# Patient Record
Sex: FEMALE | Race: WHITE | NOT HISPANIC OR LATINO | Employment: OTHER | URBAN - NONMETROPOLITAN AREA
[De-identification: names, ages, dates, MRNs, and addresses within clinical notes are randomized per-mention and may not be internally consistent; named-entity substitution may affect disease eponyms.]

---

## 2017-04-06 ENCOUNTER — OFFICE VISIT (OUTPATIENT)
Dept: UROLOGY | Facility: CLINIC | Age: 52
End: 2017-04-06

## 2017-04-06 VITALS
DIASTOLIC BLOOD PRESSURE: 68 MMHG | SYSTOLIC BLOOD PRESSURE: 110 MMHG | HEART RATE: 74 BPM | TEMPERATURE: 99.5 F | OXYGEN SATURATION: 95 %

## 2017-04-06 DIAGNOSIS — N30.00 ACUTE CYSTITIS WITHOUT HEMATURIA: Primary | ICD-10-CM

## 2017-04-06 DIAGNOSIS — N31.9 NEUROGENIC BLADDER: ICD-10-CM

## 2017-04-06 DIAGNOSIS — N32.81 OVERACTIVE BLADDER: ICD-10-CM

## 2017-04-06 DIAGNOSIS — Z78.9 SELF-CATHETERIZES URINARY BLADDER: ICD-10-CM

## 2017-04-06 DIAGNOSIS — IMO0002 SOLITARY KIDNEY: ICD-10-CM

## 2017-04-06 DIAGNOSIS — R30.0 DYSURIA: ICD-10-CM

## 2017-04-06 DIAGNOSIS — N76.2 ATROPHIC VULVITIS: ICD-10-CM

## 2017-04-06 PROBLEM — R51.9 BENIGN HEADACHE: Status: ACTIVE | Noted: 2017-04-06

## 2017-04-06 PROBLEM — M54.2 CERVICAL PAIN: Status: ACTIVE | Noted: 2017-04-06

## 2017-04-06 PROBLEM — G43.909 HEADACHE, MIGRAINE: Status: ACTIVE | Noted: 2017-04-06

## 2017-04-06 PROBLEM — G82.20 LOWER PARAPLEGIA: Status: ACTIVE | Noted: 2017-04-06

## 2017-04-06 PROBLEM — F45.8 BRUXISM: Status: ACTIVE | Noted: 2017-04-06

## 2017-04-06 LAB
BILIRUB BLD-MCNC: NEGATIVE MG/DL
CLARITY, POC: CLEAR
COLOR UR: YELLOW
GLUCOSE UR STRIP-MCNC: NEGATIVE MG/DL
KETONES UR QL: NEGATIVE
LEUKOCYTE EST, POC: ABNORMAL
NITRITE UR-MCNC: NEGATIVE MG/ML
PH UR: 7 [PH] (ref 5–8)
PROT UR STRIP-MCNC: NEGATIVE MG/DL
RBC # UR STRIP: NEGATIVE /UL
SP GR UR: 1.02 (ref 1–1.03)
UROBILINOGEN UR QL: NORMAL

## 2017-04-06 PROCEDURE — 81003 URINALYSIS AUTO W/O SCOPE: CPT | Performed by: UROLOGY

## 2017-04-06 PROCEDURE — 99214 OFFICE O/P EST MOD 30 MIN: CPT | Performed by: UROLOGY

## 2017-04-06 RX ORDER — BACLOFEN 20 MG/1
TABLET ORAL
COMMUNITY
Start: 2014-09-02 | End: 2018-03-08 | Stop reason: SDUPTHER

## 2017-04-06 RX ORDER — MINOCYCLINE HYDROCHLORIDE 50 MG/1
TABLET ORAL
COMMUNITY
Start: 2014-09-02 | End: 2018-03-15 | Stop reason: HOSPADM

## 2017-04-06 RX ORDER — METHENAMINE HIPPURATE 1000 MG/1
1 TABLET ORAL 2 TIMES DAILY WITH MEALS
Qty: 180 TABLET | Refills: 3 | Status: SHIPPED | OUTPATIENT
Start: 2017-04-06 | End: 2018-03-15 | Stop reason: HOSPADM

## 2017-04-06 RX ORDER — AMITRIPTYLINE HYDROCHLORIDE 10 MG/1
TABLET, FILM COATED ORAL
COMMUNITY
Start: 2014-09-02 | End: 2018-03-15 | Stop reason: HOSPADM

## 2017-04-06 RX ORDER — GABAPENTIN 400 MG/1
CAPSULE ORAL
COMMUNITY
Start: 2014-09-02 | End: 2018-03-08 | Stop reason: SDUPTHER

## 2017-04-06 RX ORDER — FLUTICASONE PROPIONATE 50 MCG
SPRAY, SUSPENSION (ML) NASAL
COMMUNITY
Start: 2014-10-01 | End: 2018-03-15 | Stop reason: HOSPADM

## 2017-04-06 RX ORDER — CIPROFLOXACIN 500 MG/1
500 TABLET, FILM COATED ORAL 2 TIMES DAILY
Qty: 10 TABLET | Refills: 0 | Status: SHIPPED | OUTPATIENT
Start: 2017-04-06 | End: 2018-03-08

## 2017-04-06 RX ORDER — LOTEPREDNOL ETABONATE 5 MG/G
GEL OPHTHALMIC
COMMUNITY
Start: 2014-09-25 | End: 2018-03-08 | Stop reason: SDUPTHER
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
## 2017-04-09 LAB
BACTERIA UR CULT: ABNORMAL
BACTERIA UR CULT: ABNORMAL
OTHER ANTIBIOTIC SUSC ISLT: ABNORMAL

## 2017-04-17 ENCOUNTER — TELEPHONE (OUTPATIENT)
Dept: UROLOGY | Facility: CLINIC | Age: 52
End: 2017-04-17

## 2017-04-18 NOTE — TELEPHONE ENCOUNTER
'S REPLY WAS TO TAKE CIPRO AND MYRBETRIQ AS PRESCRIBED AND TO START THE HIPREX ONCE FINISHED WITH CIPRO.

## 2017-04-24 ENCOUNTER — TELEPHONE (OUTPATIENT)
Dept: UROLOGY | Facility: CLINIC | Age: 52
End: 2017-04-24

## 2017-05-18 ENCOUNTER — TELEPHONE (OUTPATIENT)
Dept: UROLOGY | Facility: CLINIC | Age: 52
End: 2017-05-18

## 2017-10-13 ENCOUNTER — HOSPITAL ENCOUNTER (OUTPATIENT)
Dept: CT IMAGING | Facility: HOSPITAL | Age: 52
Discharge: HOME | End: 2017-10-13
Attending: FAMILY MEDICINE | Admitting: FAMILY MEDICINE

## 2017-10-13 ENCOUNTER — TRANSCRIBE ORDERS (OUTPATIENT)
Dept: ADMINISTRATIVE | Facility: HOSPITAL | Age: 52
End: 2017-10-13

## 2017-10-13 DIAGNOSIS — R10.9 ABDOMINAL PAIN, UNSPECIFIED ABDOMINAL LOCATION: ICD-10-CM

## 2017-10-13 DIAGNOSIS — R10.9 ABDOMINAL PAIN, UNSPECIFIED ABDOMINAL LOCATION: Primary | ICD-10-CM

## 2017-10-13 PROCEDURE — 74176 CT ABD & PELVIS W/O CONTRAST: CPT

## 2018-01-19 DIAGNOSIS — N32.81 OVERACTIVE BLADDER: ICD-10-CM

## 2018-01-22 RX ORDER — MIRABEGRON 50 MG/1
TABLET, FILM COATED, EXTENDED RELEASE ORAL
Qty: 90 TABLET | Refills: 3 | Status: SHIPPED | OUTPATIENT
Start: 2018-01-22 | End: 2018-12-22 | Stop reason: SDUPTHER

## 2018-03-08 ENCOUNTER — OFFICE VISIT (OUTPATIENT)
Dept: UROLOGY | Facility: CLINIC | Age: 53
End: 2018-03-08

## 2018-03-08 VITALS
SYSTOLIC BLOOD PRESSURE: 112 MMHG | OXYGEN SATURATION: 98 % | TEMPERATURE: 98.2 F | DIASTOLIC BLOOD PRESSURE: 62 MMHG | RESPIRATION RATE: 16 BRPM | HEART RATE: 68 BPM

## 2018-03-08 DIAGNOSIS — IMO0002 SOLITARY KIDNEY: ICD-10-CM

## 2018-03-08 DIAGNOSIS — R33.9 URINARY RETENTION: ICD-10-CM

## 2018-03-08 DIAGNOSIS — R10.9 FLANK PAIN: ICD-10-CM

## 2018-03-08 DIAGNOSIS — Z87.440 HISTORY OF UTI: Primary | ICD-10-CM

## 2018-03-08 DIAGNOSIS — N31.9 NEUROGENIC BLADDER: ICD-10-CM

## 2018-03-08 LAB
BILIRUB BLD-MCNC: NEGATIVE MG/DL
BUN SERPL-MCNC: 22 MG/DL (ref 7–20)
BUN/CREAT SERPL: 36.7 (ref 7.1–23.5)
CALCIUM SERPL-MCNC: 10.3 MG/DL (ref 8.4–10.2)
CHLORIDE SERPL-SCNC: 105 MMOL/L (ref 98–107)
CLARITY, POC: CLEAR
CO2 SERPL-SCNC: 29 MMOL/L (ref 26–30)
COLOR UR: YELLOW
CREAT SERPL-MCNC: 0.6 MG/DL (ref 0.6–1.3)
GFR SERPLBLD CREATININE-BSD FMLA CKD-EPI: 105 ML/MIN/1.73
GFR SERPLBLD CREATININE-BSD FMLA CKD-EPI: 128 ML/MIN/1.73
GLUCOSE SERPL-MCNC: 81 MG/DL (ref 74–98)
GLUCOSE UR STRIP-MCNC: NEGATIVE MG/DL
KETONES UR QL: NEGATIVE
LEUKOCYTE EST, POC: NEGATIVE
NITRITE UR-MCNC: NEGATIVE MG/ML
PH UR: 6.5 [PH] (ref 5–8)
POTASSIUM SERPL-SCNC: 4.5 MMOL/L (ref 3.5–5.1)
PROT UR STRIP-MCNC: NEGATIVE MG/DL
RBC # UR STRIP: NEGATIVE /UL
SODIUM SERPL-SCNC: 143 MMOL/L (ref 137–145)
SP GR UR: 1.01 (ref 1–1.03)
UROBILINOGEN UR QL: NORMAL

## 2018-03-08 PROCEDURE — 81003 URINALYSIS AUTO W/O SCOPE: CPT | Performed by: UROLOGY

## 2018-03-08 PROCEDURE — 99213 OFFICE O/P EST LOW 20 MIN: CPT | Performed by: UROLOGY

## 2018-03-08 RX ORDER — NITROFURANTOIN 25; 75 MG/1; MG/1
CAPSULE ORAL
Refills: 0 | COMMUNITY
Start: 2018-01-25 | End: 2018-03-15 | Stop reason: HOSPADM

## 2018-03-08 NOTE — PROGRESS NOTES
Chief Complaint  History of UTI (Patient self caths urinary bladder.)      LUCIO Kinsey is a 52 y.o.female paraplegic who returns today little early for her annual checkup with complaints of right sided flank and right lower quadrant abdominal pain.  She has a solitary kidney on the right side and so is rightfully concerned that this could be a problem with urinary tract.  She has a history of recurrent urinary tract infections since she has a neurogenic bladder and voids only with intermittent self catheter.  Her incontinence is much improved on the Myrbetriq after she cannot tolerate Ditropan.  Her voided urine today is actually clear and negative for blood.    She is sexually active without complaint but she has poor sensation.  She's been years without estrogens and is known to have a chronic vaginal discharge.  She did not get the Hiprex previously prescribed to help prevent infections because of financial reasons.  Her insurance didn't start covering her Myrbetriq and that's what she takes primarily.    Vitals:    03/08/18 1038   BP: 112/62   Pulse: 68   Resp: 16   Temp: 98.2 °F (36.8 °C)   SpO2: 98%       Past Medical History  Past Medical History:   Diagnosis Date   • Neurogenic bladder        Past Surgical History  Past Surgical History:   Procedure Laterality Date   • CHOLECYSTECTOMY     • NEPHRECTOMY Left        Medications  has a current medication list which includes the following prescription(s): myrbetriq, amitriptyline, baclofen, fluticasone, gabapentin, loteprednol etabonate, methenamine, minocycline, multiple vitamins-minerals, and nitrofurantoin (macrocrystal-monohydrate).    Allergies  Allergies   Allergen Reactions   • No Known Drug Allergy        Social History  Social History     Social History   • Marital status: Single     Spouse name: N/A   • Number of children: N/A   • Years of education: N/A     Occupational History   •  Disabled     Social History Main Topics   • Smoking status:  Never Smoker   • Smokeless tobacco: Never Used   • Alcohol use No   • Drug use: No   • Sexual activity: Defer     Other Topics Concern   • Not on file     Social History Narrative       Family History  No family history on file.    Review of Systems  Review of Systems    Physical Exam  Physical Exam   Constitutional: She is oriented to person, place, and time. She appears well-developed and well-nourished.   HENT:   Head: Normocephalic.   Eyes: EOM are normal. Pupils are equal, round, and reactive to light.   Neck: Normal range of motion. Neck supple.   Cardiovascular: Normal rate and regular rhythm.    Pulmonary/Chest: Effort normal.   Abdominal: Soft. Bowel sounds are normal.       Musculoskeletal: She exhibits no tenderness.        Arms:  Neurological: She is alert and oriented to person, place, and time.   Skin: Skin is warm and dry.   Psychiatric: She has a normal mood and affect.       Labs recent and today in the office:  Results for orders placed or performed in visit on 03/08/18   POC Urinalysis Dipstick, Automated   Result Value Ref Range    Color Yellow Yellow, Straw, Dark Yellow, Yris    Clarity, UA Clear Clear    Glucose, UA Negative Negative, 1000 mg/dL (3+) mg/dL    Bilirubin Negative Negative    Ketones, UA Negative Negative    Specific Gravity  1.015 1.005 - 1.030    Blood, UA Negative Negative    pH, Urine 6.5 5.0 - 8.0    Protein, POC Negative Negative mg/dL    Urobilinogen, UA Normal Normal    Leukocytes Negative Negative    Nitrite, UA Negative Negative         Assessment & Plan  Abdominal pain: We'll obtain a CT scan to rule out  pathology and monitor her solitary kidney.  She also wants to rule out a bladder stone.    Recurrent urinary tract infection: Thankfully at this point her urine is clear     Urinary retention/neurogenic bladder: Continue the intermittent self catheter.

## 2018-03-13 ENCOUNTER — HOSPITAL ENCOUNTER (OUTPATIENT)
Dept: CT IMAGING | Facility: HOSPITAL | Age: 53
Discharge: HOME | End: 2018-03-13
Attending: UROLOGY | Admitting: UROLOGY

## 2018-03-13 PROCEDURE — 74176 CT ABD & PELVIS W/O CONTRAST: CPT

## 2018-03-15 ENCOUNTER — OFFICE VISIT (OUTPATIENT)
Dept: UROLOGY | Facility: CLINIC | Age: 53
End: 2018-03-15

## 2018-03-15 VITALS
DIASTOLIC BLOOD PRESSURE: 74 MMHG | SYSTOLIC BLOOD PRESSURE: 118 MMHG | HEART RATE: 73 BPM | TEMPERATURE: 98.1 F | RESPIRATION RATE: 16 BRPM | OXYGEN SATURATION: 99 %

## 2018-03-15 DIAGNOSIS — E83.52 HYPERCALCEMIA: Primary | ICD-10-CM

## 2018-03-15 PROCEDURE — 99214 OFFICE O/P EST MOD 30 MIN: CPT | Performed by: UROLOGY

## 2018-03-15 NOTE — PROGRESS NOTES
Chief Complaint  Urinary Retention (self caths, follow up ct.)      LUCIO De Luna is a 52 y.o.female who is a paraplegic with neurogenic bladder handles her voiding with clean intermittent self catheter and has done very well.  She does have a solitary right kidney and with some discomfort recently she was concerned about pathology in that only kidney.  Fortunately recent CT scan revealed normal right-sided urinary tract without any suggestion of kidney stones.  She did have a proximal duct cyst found incidentally and has an appointment with her gynecologist next month.    Vitals:    03/15/18 1128   BP: 118/74   Pulse: 73   Resp: 16   Temp: 98.1 °F (36.7 °C)   SpO2: 99%       Past Medical History  Past Medical History:   Diagnosis Date   • Neurogenic bladder        Past Surgical History  Past Surgical History:   Procedure Laterality Date   • CHOLECYSTECTOMY     • NEPHRECTOMY Left        Medications  has a current medication list which includes the following prescription(s): myrbetriq, nitrofurantoin (macrocrystal-monohydrate), amitriptyline, baclofen, fluticasone, gabapentin, loteprednol etabonate, methenamine, minocycline, and multiple vitamins-minerals.    Allergies  Allergies   Allergen Reactions   • No Known Drug Allergy        Social History  Social History     Social History   • Marital status: Single     Spouse name: N/A   • Number of children: N/A   • Years of education: N/A     Occupational History   •  Disabled     Social History Main Topics   • Smoking status: Never Smoker   • Smokeless tobacco: Never Used   • Alcohol use No   • Drug use: No   • Sexual activity: Defer     Other Topics Concern   • Not on file     Social History Narrative   • No narrative on file       Family History  No family history on file.    Review of Systems  Review of Systems   Constitutional: Negative.    Genitourinary: Negative.        Physical Exam  Physical Exam    Labs recent and today in the office:  Results for  orders placed or performed in visit on 03/08/18   Basic Metabolic Panel   Result Value Ref Range    Glucose 81 74 - 98 mg/dL    BUN 22 (H) 7 - 20 mg/dL    Creatinine 0.60 0.60 - 1.30 mg/dL    eGFR Non African Am 105 >60 mL/min/1.73    eGFR African Am 128 >60 mL/min/1.73    BUN/Creatinine Ratio 36.7 (H) 7.1 - 23.5    Sodium 143 137 - 145 mmol/L    Potassium 4.5 3.5 - 5.1 mmol/L    Chloride 105 98 - 107 mmol/L    Total CO2 29.0 26.0 - 30.0 mmol/L    Calcium 10.3 (H) 8.4 - 10.2 mg/dL   POC Urinalysis Dipstick, Automated   Result Value Ref Range    Color Yellow Yellow, Straw, Dark Yellow, Yris    Clarity, UA Clear Clear    Glucose, UA Negative Negative, 1000 mg/dL (3+) mg/dL    Bilirubin Negative Negative    Ketones, UA Negative Negative    Specific Gravity  1.015 1.005 - 1.030    Blood, UA Negative Negative    pH, Urine 6.5 5.0 - 8.0    Protein, POC Negative Negative mg/dL    Urobilinogen, UA Normal Normal    Leukocytes Negative Negative    Nitrite, UA Negative Negative         Assessment & Plan  neurogenic bladder: She'll continue clean intermittent self catheter.    Solitary right kidney: No evidence pathology at this time    Hypercalcemia: Parathyroid profile is obtained.  She'll need referral to general surgery if abnormal

## 2018-03-16 LAB
CALCIUM SERPL-MCNC: 10 MG/DL (ref 8.7–10.2)
INTACT PTH: NORMAL
PTH-INTACT SERPL-MCNC: 25 PG/ML (ref 15–65)

## 2018-12-22 DIAGNOSIS — N32.81 OVERACTIVE BLADDER: ICD-10-CM

## 2018-12-26 RX ORDER — MIRABEGRON 50 MG/1
TABLET, FILM COATED, EXTENDED RELEASE ORAL
Qty: 90 TABLET | Refills: 3 | Status: SHIPPED | OUTPATIENT
Start: 2018-12-26

## 2019-03-21 ENCOUNTER — OFFICE VISIT (OUTPATIENT)
Dept: UROLOGY | Facility: CLINIC | Age: 54
End: 2019-03-21

## 2019-03-21 VITALS — HEART RATE: 91 BPM | OXYGEN SATURATION: 99 % | SYSTOLIC BLOOD PRESSURE: 122 MMHG | DIASTOLIC BLOOD PRESSURE: 68 MMHG

## 2019-03-21 DIAGNOSIS — Z78.9 SELF-CATHETERIZES URINARY BLADDER: ICD-10-CM

## 2019-03-21 DIAGNOSIS — N31.9 NEUROGENIC BLADDER: Primary | ICD-10-CM

## 2019-03-21 DIAGNOSIS — IMO0002 SOLITARY RIGHT KIDNEY: ICD-10-CM

## 2019-03-21 LAB
BILIRUB BLD-MCNC: NEGATIVE MG/DL
BUN SERPL-MCNC: 15 MG/DL (ref 7–20)
BUN/CREAT SERPL: 30 (ref 7.1–23.5)
CALCIUM SERPL-MCNC: 10.3 MG/DL (ref 8.4–10.2)
CHLORIDE SERPL-SCNC: 106 MMOL/L (ref 98–107)
CLARITY, POC: CLEAR
CO2 SERPL-SCNC: 27 MMOL/L (ref 26–30)
COLOR UR: YELLOW
CREAT SERPL-MCNC: 0.5 MG/DL (ref 0.6–1.3)
GLUCOSE SERPL-MCNC: 79 MG/DL (ref 74–98)
GLUCOSE UR STRIP-MCNC: NEGATIVE MG/DL
KETONES UR QL: NEGATIVE
LEUKOCYTE EST, POC: NEGATIVE
NITRITE UR-MCNC: NEGATIVE MG/ML
PH UR: 6 [PH] (ref 5–8)
POTASSIUM SERPL-SCNC: 4.6 MMOL/L (ref 3.5–5.1)
PROT UR STRIP-MCNC: NEGATIVE MG/DL
RBC # UR STRIP: NEGATIVE /UL
SODIUM SERPL-SCNC: 139 MMOL/L (ref 137–145)
SP GR UR: 1.02 (ref 1–1.03)
UROBILINOGEN UR QL: NORMAL

## 2019-03-21 PROCEDURE — 99213 OFFICE O/P EST LOW 20 MIN: CPT | Performed by: UROLOGY

## 2019-03-21 PROCEDURE — 81003 URINALYSIS AUTO W/O SCOPE: CPT | Performed by: UROLOGY

## 2019-03-21 RX ORDER — NITROFURANTOIN MACROCRYSTALS 100 MG/1
CAPSULE ORAL
COMMUNITY
End: 2019-03-21 | Stop reason: SDUPTHER

## 2019-03-21 RX ORDER — NITROFURANTOIN MACROCRYSTALS 100 MG/1
100 CAPSULE ORAL 2 TIMES DAILY
Qty: 20 CAPSULE | Refills: 3 | Status: SHIPPED | OUTPATIENT
Start: 2019-03-21 | End: 2019-03-31

## 2019-03-21 RX ORDER — LOTEPREDNOL ETABONATE 5 MG/ML
SUSPENSION/ DROPS OPHTHALMIC
COMMUNITY

## 2019-03-21 RX ORDER — FLUOCINOLONE ACETONIDE 0.11 MG/ML
OIL AURICULAR (OTIC)
COMMUNITY

## 2019-03-21 NOTE — PROGRESS NOTES
Chief Complaint  Neurogenic bladder/self cath (Yearly follow up.) and Solitary Right Kidney      HPI  Sosa De Luna is a 53 y.o.female who returns for follow-up with a neurogenic bladder associated with her paraplegia that is being handled by her with intermittent self cath.  He is actually done very well except for skin problems from her urge incontinence in between the catheterizations.  She has had only rare urinary tract infections and treat herself periodically with a short course of Macrobid with good effect.  She returns today with clear urine.  Her heart and incontinence is much helped with Myrbetriq but she is having a hard time getting it provided thru her insurance company.    She has recently gotten  and is having mild dyspareunia associated with inadequate lubrication.  She is many years menopausal and is never used estrogens.    There were no vitals filed for this visit.    Past Medical History  Past Medical History:   Diagnosis Date   • Neurogenic bladder        Past Surgical History  Past Surgical History:   Procedure Laterality Date   • CHOLECYSTECTOMY     • NEPHRECTOMY Left        Medications  has a current medication list which includes the following prescription(s): baclofen, betamethasone valerate, fluocinolone acetonide, gabapentin, loteprednol, multiple vitamins-minerals, myrbetriq, and nitrofurantoin.    Allergies  Allergies   Allergen Reactions   • No Known Drug Allergy        Social History  Social History     Socioeconomic History   • Marital status: Single     Spouse name: Not on file   • Number of children: Not on file   • Years of education: Not on file   • Highest education level: Not on file   Occupational History     Employer: DISABLED   Tobacco Use   • Smoking status: Never Smoker   • Smokeless tobacco: Never Used   Substance and Sexual Activity   • Alcohol use: No   • Drug use: No   • Sexual activity: Defer       Family History  No family history on file.    Review of  Systems  Review of Systems   Constitutional: Negative.    Genitourinary: Positive for difficulty urinating, dyspareunia, dysuria, frequency and urgency.   Musculoskeletal: Positive for gait problem.   All other systems reviewed and are negative.      Physical Exam  Physical Exam    Labs recent and today in the office:  Results for orders placed or performed in visit on 03/21/19   POC Urinalysis Dipstick, Automated   Result Value Ref Range    Color Yellow Yellow, Straw, Dark Yellow, Yris    Clarity, UA Clear Clear    Specific Gravity  1.020 1.005 - 1.030    pH, Urine 6.0 5.0 - 8.0    Leukocytes Negative Negative    Nitrite, UA Negative Negative    Protein, POC Negative Negative mg/dL    Glucose, UA Negative Negative, 1000 mg/dL (3+) mg/dL    Ketones, UA Negative Negative    Urobilinogen, UA Normal Normal    Bilirubin Negative Negative    Blood, UA Negative Negative         Assessment & Plan  #1 neurogenic bladder/recurrent urinary tract infection: She is handled this very well with intermittent self cath but still has problems with urge incontinence from bladder spasticity.  This is improved on Myrbetriq but in discussion with Dr. Ling he feels she may be a candidate for Botox injections.    2.  Atrophic vaginitis: I suggested a course of topical Estrace vaginal cream to see if it will help her irritated tissues and dyspareunia.

## 2019-04-30 DIAGNOSIS — N31.9 NEUROGENIC BLADDER: Primary | ICD-10-CM

## 2019-04-30 DIAGNOSIS — Z87.440 HISTORY OF UTI: ICD-10-CM

## 2019-04-30 RX ORDER — NITROFURANTOIN 25; 75 MG/1; MG/1
100 CAPSULE ORAL 2 TIMES DAILY
Qty: 14 CAPSULE | Refills: 2 | Status: SHIPPED | OUTPATIENT
Start: 2019-04-30 | End: 2021-08-24 | Stop reason: SDUPTHER

## 2019-09-24 ENCOUNTER — OFFICE VISIT (OUTPATIENT)
Dept: UROLOGY | Facility: CLINIC | Age: 54
End: 2019-09-24

## 2019-09-24 VITALS
TEMPERATURE: 98.3 F | HEART RATE: 66 BPM | DIASTOLIC BLOOD PRESSURE: 74 MMHG | OXYGEN SATURATION: 99 % | SYSTOLIC BLOOD PRESSURE: 116 MMHG

## 2019-09-24 DIAGNOSIS — N30.00 ACUTE CYSTITIS WITHOUT HEMATURIA: ICD-10-CM

## 2019-09-24 DIAGNOSIS — N31.9 NEUROGENIC BLADDER: Primary | ICD-10-CM

## 2019-09-24 LAB
BILIRUB BLD-MCNC: NEGATIVE MG/DL
CLARITY, POC: CLEAR
COLOR UR: YELLOW
GLUCOSE UR STRIP-MCNC: NEGATIVE MG/DL
KETONES UR QL: NEGATIVE
LEUKOCYTE EST, POC: ABNORMAL
NITRITE UR-MCNC: NEGATIVE MG/ML
PH UR: 6 [PH] (ref 5–8)
PROT UR STRIP-MCNC: NEGATIVE MG/DL
RBC # UR STRIP: NEGATIVE /UL
SP GR UR: 1.01 (ref 1–1.03)
UROBILINOGEN UR QL: NORMAL

## 2019-09-24 PROCEDURE — 99213 OFFICE O/P EST LOW 20 MIN: CPT | Performed by: UROLOGY

## 2019-09-24 PROCEDURE — 81003 URINALYSIS AUTO W/O SCOPE: CPT | Performed by: UROLOGY

## 2019-09-24 RX ORDER — SULFAMETHOXAZOLE AND TRIMETHOPRIM 400; 80 MG/1; MG/1
1 TABLET ORAL 2 TIMES DAILY
Qty: 20 TABLET | Refills: 0 | Status: SHIPPED | OUTPATIENT
Start: 2019-09-24 | End: 2019-09-26 | Stop reason: SDUPTHER

## 2019-09-24 RX ORDER — GABAPENTIN 300 MG/1
CAPSULE ORAL
COMMUNITY
Start: 2019-08-27

## 2019-09-24 RX ORDER — OXYBUTYNIN CHLORIDE 15 MG/1
TABLET, EXTENDED RELEASE ORAL
COMMUNITY

## 2019-09-24 NOTE — PROGRESS NOTES
Chief Complaint  Neurogenic bladder (6 month follow up.)      LUCIO De Luna is a 54 y.o.female who returns today for routine follow-up of her neurogenic bladder generally doing very well on her intermittent self cath.  She does state recently she started to have more sediment in the urine along with some more aches and pains and she was wondering if she might have an infection.  She does have some white cells and therefore we will send her cath specimen for culture and give her a short course of antibiotics pending the result.  She continues to receive good benefit from Myrbetriq with less incontinence between catheterizations.  She states she has had a lifelong history of vaginal discharge and did not use the Estrace vaginal cream suggested that might help.    There were no vitals filed for this visit.    Past Medical History  Past Medical History:   Diagnosis Date   • Neurogenic bladder        Past Surgical History  Past Surgical History:   Procedure Laterality Date   • CHOLECYSTECTOMY     • NEPHRECTOMY Left        Medications  has a current medication list which includes the following prescription(s): baclofen, betamethasone valerate, fluocinolone acetonide, gabapentin, loteprednol, multiple vitamins-minerals, myrbetriq, gabapentin, nitrofurantoin (macrocrystal-monohydrate), and oxybutynin xl.    Allergies  Allergies   Allergen Reactions   • No Known Drug Allergy        Social History  Social History     Socioeconomic History   • Marital status: Single     Spouse name: Not on file   • Number of children: Not on file   • Years of education: Not on file   • Highest education level: Not on file   Occupational History     Employer: DISABLED   Tobacco Use   • Smoking status: Never Smoker   • Smokeless tobacco: Never Used   Substance and Sexual Activity   • Alcohol use: No   • Drug use: No   • Sexual activity: Defer       Family History  No family history on file.    Review of Systems  Review of Systems    Constitutional: Negative for activity change, appetite change, chills, fatigue, unexpected weight gain and unexpected weight loss.   HENT: Negative for sneezing.    Respiratory: Negative for cough, chest tightness, shortness of breath and wheezing.    Cardiovascular: Negative for chest pain, palpitations and leg swelling.   Gastrointestinal: Negative for abdominal distention, abdominal pain, anal bleeding, blood in stool, constipation, diarrhea, nausea, rectal pain and indigestion.   Genitourinary: Positive for dysuria and vaginal discharge. Negative for amenorrhea, decreased libido, decreased urine volume, difficulty urinating, dyspareunia, flank pain, frequency, genital sores, hematuria, menstrual problem, pelvic pain, pelvic pressure, urgency, urinary incontinence, vaginal bleeding and vaginal pain.   Musculoskeletal: Positive for gait problem. Negative for back pain and joint swelling.   Neurological: Positive for weakness. Negative for tremors, seizures, speech difficulty, numbness and confusion.   Psychiatric/Behavioral: Negative for behavioral problems, dysphoric mood, self-injury, sleep disturbance, suicidal ideas, negative for hyperactivity, depressed mood and stress. The patient is not nervous/anxious.    All other systems reviewed and are negative.      Physical Exam  Physical Exam   Constitutional: She is oriented to person, place, and time. She appears well-developed and well-nourished.   HENT:   Head: Normocephalic.   Eyes: EOM are normal. Pupils are equal, round, and reactive to light.   Neck: Normal range of motion. Neck supple.   Cardiovascular: Normal rate and regular rhythm.   Pulmonary/Chest: Effort normal.   Neurological: She is alert and oriented to person, place, and time.   Skin: Skin is warm and dry.   Psychiatric: She has a normal mood and affect. Her behavior is normal.       Labs recent and today in the office:  Results for orders placed or performed in visit on 09/24/19   POC  Urinalysis Dipstick, Automated   Result Value Ref Range    Color Yellow Yellow, Straw, Dark Yellow, Yris    Clarity, UA Clear Clear    Specific Gravity  1.015 1.005 - 1.030    pH, Urine 6.0 5.0 - 8.0    Leukocytes Small (1+) (A) Negative    Nitrite, UA Negative Negative    Protein, POC Negative Negative mg/dL    Glucose, UA Negative Negative, 1000 mg/dL (3+) mg/dL    Ketones, UA Negative Negative    Urobilinogen, UA Normal Normal    Bilirubin Negative Negative    Blood, UA Negative Negative         Assessment & Plan  Neurogenic bladder/UTI: Continue the intermittent self cath push fluids and take a short course of antibiotics.  She will return in 6 months and as needed    Urge incontinence: Significant benefit from Myrbetriq

## 2019-09-26 DIAGNOSIS — N30.00 ACUTE CYSTITIS WITHOUT HEMATURIA: ICD-10-CM

## 2019-09-26 RX ORDER — SULFAMETHOXAZOLE AND TRIMETHOPRIM 400; 80 MG/1; MG/1
1 TABLET ORAL 2 TIMES DAILY
Qty: 20 TABLET | Refills: 0 | Status: SHIPPED | OUTPATIENT
Start: 2019-09-26 | End: 2022-08-11

## 2019-09-26 NOTE — TELEPHONE ENCOUNTER
Patient called asking Sulfa antibiotic to be sent to [facility].

## 2019-09-27 LAB
BACTERIA UR CULT: ABNORMAL
BACTERIA UR CULT: ABNORMAL
OTHER ANTIBIOTIC SUSC ISLT: ABNORMAL

## 2020-01-21 ENCOUNTER — TRANSCRIBE ORDERS (OUTPATIENT)
Dept: ADMINISTRATIVE | Facility: HOSPITAL | Age: 55
End: 2020-01-21

## 2020-01-21 DIAGNOSIS — R10.31 RLQ ABDOMINAL PAIN: Primary | ICD-10-CM

## 2020-01-22 ENCOUNTER — HOSPITAL ENCOUNTER (OUTPATIENT)
Dept: CT IMAGING | Facility: HOSPITAL | Age: 55
Discharge: HOME | End: 2020-01-22
Admitting: NURSE PRACTITIONER

## 2020-01-22 DIAGNOSIS — R10.31 RLQ ABDOMINAL PAIN: ICD-10-CM

## 2020-01-22 PROCEDURE — 74176 CT ABD & PELVIS W/O CONTRAST: CPT

## 2020-01-22 PROCEDURE — 0 DIATRIZOATE MEGLUMINE & SODIUM PER 1 ML: Performed by: NURSE PRACTITIONER

## 2020-01-22 RX ADMIN — DIATRIZOATE MEGLUMINE AND DIATRIZOATE SODIUM 15 ML: 660; 100 LIQUID ORAL; RECTAL at 14:50

## 2021-08-17 ENCOUNTER — OFFICE VISIT (OUTPATIENT)
Dept: OBSTETRICS AND GYNECOLOGY | Facility: CLINIC | Age: 56
End: 2021-08-17

## 2021-08-17 VITALS
HEIGHT: 64 IN | WEIGHT: 108 LBS | BODY MASS INDEX: 18.44 KG/M2 | SYSTOLIC BLOOD PRESSURE: 128 MMHG | DIASTOLIC BLOOD PRESSURE: 70 MMHG

## 2021-08-17 DIAGNOSIS — Z01.419 ENCOUNTER FOR GYNECOLOGICAL EXAMINATION WITHOUT ABNORMAL FINDING: Primary | ICD-10-CM

## 2021-08-17 DIAGNOSIS — Z12.39 ENCOUNTER FOR BREAST CANCER SCREENING OTHER THAN MAMMOGRAM: ICD-10-CM

## 2021-08-17 DIAGNOSIS — M79.621 AXILLARY PAIN, RIGHT: ICD-10-CM

## 2021-08-17 DIAGNOSIS — N64.4 BREAST PAIN, RIGHT: ICD-10-CM

## 2021-08-17 DIAGNOSIS — N95.2 POSTMENOPAUSAL ATROPHIC VAGINITIS: ICD-10-CM

## 2021-08-17 DIAGNOSIS — R63.4 WEIGHT LOSS: ICD-10-CM

## 2021-08-17 PROCEDURE — G0101 CA SCREEN;PELVIC/BREAST EXAM: HCPCS | Performed by: OBSTETRICS & GYNECOLOGY

## 2021-08-17 PROCEDURE — 99203 OFFICE O/P NEW LOW 30 MIN: CPT | Performed by: OBSTETRICS & GYNECOLOGY

## 2021-08-17 RX ORDER — BACILLUS COAGULANS/INULIN 1B-250 MG
1 CAPSULE ORAL DAILY
COMMUNITY

## 2021-08-17 RX ORDER — TRIAMCINOLONE ACETONIDE 1 MG/G
CREAM TOPICAL 2 TIMES DAILY
COMMUNITY
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
## 2021-08-24 ENCOUNTER — OFFICE VISIT (OUTPATIENT)
Dept: UROLOGY | Facility: CLINIC | Age: 56
End: 2021-08-24

## 2021-08-24 VITALS
DIASTOLIC BLOOD PRESSURE: 75 MMHG | OXYGEN SATURATION: 98 % | HEART RATE: 76 BPM | RESPIRATION RATE: 16 BRPM | SYSTOLIC BLOOD PRESSURE: 129 MMHG | TEMPERATURE: 98 F

## 2021-08-24 DIAGNOSIS — R39.9 UTI SYMPTOMS: Primary | ICD-10-CM

## 2021-08-24 DIAGNOSIS — Z87.440 HISTORY OF UTI: ICD-10-CM

## 2021-08-24 DIAGNOSIS — N31.9 NEUROGENIC BLADDER: ICD-10-CM

## 2021-08-24 LAB
BILIRUB BLD-MCNC: NEGATIVE MG/DL
CLARITY, POC: CLEAR
COLOR UR: YELLOW
GLUCOSE UR STRIP-MCNC: NEGATIVE MG/DL
KETONES UR QL: ABNORMAL
LEUKOCYTE EST, POC: ABNORMAL
NITRITE UR-MCNC: NEGATIVE MG/ML
PH UR: 5.5 [PH] (ref 5–8)
PROT UR STRIP-MCNC: NEGATIVE MG/DL
RBC # UR STRIP: NEGATIVE /UL
SP GR UR: 1.02 (ref 1–1.03)
UROBILINOGEN UR QL: NORMAL

## 2021-08-24 PROCEDURE — 99214 OFFICE O/P EST MOD 30 MIN: CPT | Performed by: UROLOGY

## 2021-08-24 PROCEDURE — 81003 URINALYSIS AUTO W/O SCOPE: CPT | Performed by: UROLOGY

## 2021-08-24 RX ORDER — NITROFURANTOIN 25; 75 MG/1; MG/1
100 CAPSULE ORAL 2 TIMES DAILY
Qty: 14 CAPSULE | Refills: 5 | Status: SHIPPED | OUTPATIENT
Start: 2021-08-24 | End: 2022-07-26 | Stop reason: SDUPTHER

## 2021-08-24 NOTE — PROGRESS NOTES
Chief Complaint  UTI Symptoms      HPI  Sosa De Luna is a 55 y.o.female who returns today requesting urgent evaluation for urinary tract infection.  She has a known neurogenic bladder and self caths 4 times per day.  She also has chronic atrophic vaginitis confirmed by Dr. Argueta on a recent visit and has a heavy vaginal discharge which contaminates voided urine samples.  Unfortunately her cath urine today appears to be infected so culture will be submitted.  Has had great luck with Macrodantin in the past and request that this be provided along with some refills.    Vitals:    08/24/21 1411   BP: 129/75   Pulse: 76   Resp: 16   Temp: 98 °F (36.7 °C)   SpO2: 98%       Past Medical History  Past Medical History:   Diagnosis Date   • Anxiety    • Neurogenic bladder    • Renal disorder     left nephrectomy   • Trauma    • Urinary tract infection        Past Surgical History  Past Surgical History:   Procedure Laterality Date   • BARTHOLIN CYST MARSUPIALIZATION     • CHOLECYSTECTOMY     • GALLBLADDER SURGERY     • NEPHRECTOMY Left        Medications  has a current medication list which includes the following prescription(s): probiotic, baclofen, betamethasone valerate, fluocinolone acetonide, gabapentin, gabapentin, loteprednol, multiple vitamin, myrbetriq, triamcinolone, nitrofurantoin (macrocrystal-monohydrate), oxybutynin xl, and sulfamethoxazole-trimethoprim.    Allergies  Allergies   Allergen Reactions   • Latex Unknown - Low Severity   • Tape Rash       Social History  Social History     Socioeconomic History   • Marital status:      Spouse name: Not on file   • Number of children: Not on file   • Years of education: Not on file   • Highest education level: Not on file   Tobacco Use   • Smoking status: Never Smoker   • Smokeless tobacco: Never Used   Vaping Use   • Vaping Use: Never used   Substance and Sexual Activity   • Alcohol use: No   • Drug use: No   • Sexual activity: Defer       Family  History  Family History   Problem Relation Age of Onset   • Hypertension Father    • Thyroid cancer Mother    • Hyperlipidemia Mother        Review of Systems  Review of Systems   Constitutional: Negative for activity change, appetite change, chills, fatigue, unexpected weight gain and unexpected weight loss.   HENT: Negative for sneezing.    Respiratory: Negative for cough, chest tightness, shortness of breath and wheezing.    Cardiovascular: Negative for chest pain, palpitations and leg swelling.   Gastrointestinal: Negative for abdominal distention, abdominal pain, anal bleeding, blood in stool, constipation, diarrhea, nausea, rectal pain and indigestion.   Genitourinary: Positive for difficulty urinating. Negative for amenorrhea, decreased libido, decreased urine volume, dyspareunia, dysuria, flank pain, frequency, genital sores, hematuria, menstrual problem, pelvic pain, pelvic pressure, urgency, urinary incontinence, vaginal bleeding, vaginal discharge and vaginal pain.   Musculoskeletal: Negative for back pain and joint swelling.   Neurological: Negative for tremors, seizures, speech difficulty, weakness, numbness and confusion.   Psychiatric/Behavioral: Negative for behavioral problems, dysphoric mood, self-injury, sleep disturbance, suicidal ideas, negative for hyperactivity, depressed mood and stress. The patient is not nervous/anxious.        Physical Exam  Physical Exam  Constitutional:       Appearance: She is well-developed.   HENT:      Head: Normocephalic.   Eyes:      Pupils: Pupils are equal, round, and reactive to light.   Cardiovascular:      Rate and Rhythm: Normal rate and regular rhythm.      Heart sounds: Normal heart sounds.   Pulmonary:      Effort: Pulmonary effort is normal.   Abdominal:      General: Bowel sounds are normal.      Palpations: Abdomen is soft.   Musculoskeletal:      Cervical back: Normal range of motion and neck supple.   Skin:     General: Skin is warm and dry.    Neurological:      Mental Status: She is alert and oriented to person, place, and time.         Labs recent and today in the office:  Results for orders placed or performed in visit on 08/24/21   POC Urinalysis Dipstick, Automated    Specimen: Urine   Result Value Ref Range    Color Yellow Yellow, Straw, Dark Yellow, Yris    Clarity, UA Clear Clear    Specific Gravity  1.025 1.005 - 1.030    pH, Urine 5.5 5.0 - 8.0    Leukocytes Trace (A) Negative    Nitrite, UA Negative Negative    Protein, POC Negative Negative mg/dL    Glucose, UA Negative Negative, 1000 mg/dL (3+) mg/dL    Ketones, UA 3+ (A) Negative    Urobilinogen, UA Normal Normal    Bilirubin Negative Negative    Blood, UA Negative Negative         Assessment & Plan  Neurogenic bladder/acute cystitis without hematuria: Macrodantin is provided pending results of a culture she is encouraged to continue catheter and return as needed.  She declines an offer of topical Estrace vaginal cream.

## 2021-09-03 ENCOUNTER — TELEPHONE (OUTPATIENT)
Dept: OBSTETRICS AND GYNECOLOGY | Facility: CLINIC | Age: 56
End: 2021-09-03

## 2021-09-10 DIAGNOSIS — Z01.419 ENCOUNTER FOR GYNECOLOGICAL EXAMINATION WITHOUT ABNORMAL FINDING: ICD-10-CM

## 2021-09-10 RX ORDER — CONJUGATED ESTROGENS 0.62 MG/G
CREAM VAGINAL
Qty: 1 EACH | Refills: 11 | Status: SHIPPED | OUTPATIENT
Start: 2021-09-10

## 2022-07-26 ENCOUNTER — OFFICE VISIT (OUTPATIENT)
Dept: UROLOGY | Facility: CLINIC | Age: 57
End: 2022-07-26

## 2022-07-26 VITALS
OXYGEN SATURATION: 99 % | HEART RATE: 79 BPM | SYSTOLIC BLOOD PRESSURE: 130 MMHG | TEMPERATURE: 97.5 F | DIASTOLIC BLOOD PRESSURE: 80 MMHG

## 2022-07-26 DIAGNOSIS — Z87.440 HISTORY OF UTI: ICD-10-CM

## 2022-07-26 DIAGNOSIS — N31.9 NEUROGENIC BLADDER: Primary | ICD-10-CM

## 2022-07-26 LAB
BILIRUB BLD-MCNC: NEGATIVE MG/DL
CLARITY, POC: CLEAR
COLOR UR: ABNORMAL
EXPIRATION DATE: ABNORMAL
GLUCOSE UR STRIP-MCNC: NEGATIVE MG/DL
KETONES UR QL: ABNORMAL
LEUKOCYTE EST, POC: NEGATIVE
Lab: ABNORMAL
NITRITE UR-MCNC: NEGATIVE MG/ML
PH UR: 5.5 [PH] (ref 5–8)
PROT UR STRIP-MCNC: ABNORMAL MG/DL
RBC # UR STRIP: NEGATIVE /UL
SP GR UR: 1.03 (ref 1–1.03)
UROBILINOGEN UR QL: NORMAL

## 2022-07-26 PROCEDURE — 99213 OFFICE O/P EST LOW 20 MIN: CPT | Performed by: NURSE PRACTITIONER

## 2022-07-26 PROCEDURE — 81003 URINALYSIS AUTO W/O SCOPE: CPT | Performed by: NURSE PRACTITIONER

## 2022-07-26 RX ORDER — GABAPENTIN 300 MG/1
600 CAPSULE ORAL
COMMUNITY
Start: 2022-03-07

## 2022-07-26 RX ORDER — BACLOFEN 20 MG/1
20 TABLET ORAL DAILY
COMMUNITY
Start: 2022-07-12

## 2022-07-26 RX ORDER — MIRTAZAPINE 15 MG/1
15 TABLET, FILM COATED ORAL
COMMUNITY
Start: 2022-02-03

## 2022-07-26 RX ORDER — CLOTRIMAZOLE AND BETAMETHASONE DIPROPIONATE 10; .64 MG/G; MG/G
CREAM TOPICAL
COMMUNITY
Start: 2022-03-07

## 2022-07-26 RX ORDER — CYCLOBENZAPRINE HCL 5 MG
5 TABLET ORAL
COMMUNITY
Start: 2022-03-07

## 2022-07-26 RX ORDER — NITROFURANTOIN 25; 75 MG/1; MG/1
100 CAPSULE ORAL 2 TIMES DAILY
Qty: 14 CAPSULE | Refills: 5 | Status: SHIPPED | OUTPATIENT
Start: 2022-07-26 | End: 2022-08-11

## 2022-07-26 NOTE — PROGRESS NOTES
Office Visit General Established Female Patient     Patient Name: Sosa De Luna  : 1965   MRN: [id number]     Chief Complaint:   Chief Complaint   Patient presents with   • Cystitis     Patient has cath   • Follow-up     Amada Batista       Referring Provider: No ref. provider found    History of Present Illness: Ms. Sosa De Luna is a 56 y.o. female with history below in assessment, who presents for follow up.  Had cystitis last week she keeps Macrobid on hand.  Ran out last week and took some from her mom. Feeling better today.  Uses Magic 3 12 Fr male catheters to self cath  Requesting BMP today to check kidney function        Subjective      Review of System:   Constitutional: No fevers or chills  Skin: Negative for rash  Endocrine: No heat/cold intolerance   Cardiovascular: Negative for chest pain or dyspnea on exertion, occassional palpatations  Respiratory: Negative for shortness of breath or wheezing  Gastrointestinal: No constipation, nausea or vomiting  Genitourinary: Negative for new lower urinary tract symptoms, current gross hematuria or dysuria.  Musculoskeletal: paraplegic         Past Medical History:   Past Medical History:   Diagnosis Date   • Anxiety    • Neurogenic bladder    • Renal disorder     left nephrectomy   • Trauma    • Urinary tract infection        Past Surgical History:   Past Surgical History:   Procedure Laterality Date   • BARTHOLIN CYST MARSUPIALIZATION     • CHOLECYSTECTOMY     • GALLBLADDER SURGERY     • NEPHRECTOMY Left        Family History:   Family History   Problem Relation Age of Onset   • Hypertension Father    • Thyroid cancer Mother    • Hyperlipidemia Mother        Social History:   Social History     Socioeconomic History   • Marital status:    Tobacco Use   • Smoking status: Never Smoker   • Smokeless tobacco: Never Used   Vaping Use   • Vaping Use: Never used   Substance and Sexual Activity   • Alcohol use: No   • Drug use: No   •  Sexual activity: Defer       Medications:     Current Outpatient Medications:   •  Bacillus Coagulans-Inulin (Probiotic) 1-250 BILLION-MG capsule, Take 1 tablet by mouth Daily., Disp: , Rfl:   •  baclofen (LIORESAL) 20 MG tablet, Take 20 mg by mouth Daily., Disp: , Rfl:   •  betamethasone valerate (VALISONE) 0.1 % cream, betamethasone valerate 0.1 % topical cream  APPLY A THIN LAYER TO THE AFFECTED AREA(S) BY TOPICAL ROUTE ONCE BID DAILY X 10 DAYS, Disp: , Rfl:   •  clotrimazole-betamethasone (LOTRISONE) 1-0.05 % cream,  1 betzy, Topical BID, 15 gm, Refill(s) 1, in each ear canal BID X 7 days, Route to Pharmacy Electronically, [id number], 03/07/22 15:07:00 EST, Height/Length Dosing, cm, 54.9, 03/07/22 15:07:00 EST, Weight Dosing, kg, Disp: , Rfl:   •  conjugated estrogens (Premarin) 0.625 MG/GM vaginal cream, Use 0.5 grams intravaginally twice weekly to control symptoms., Disp: 1 each, Rfl: 11  •  cyclobenzaprine (FLEXERIL) 5 MG tablet, 5 mg., Disp: , Rfl:   •  fluocinolone acetonide (DERMOTIC) 0.01 % oil otic oil, DermOtic Oil 0.01 % ear drops  INSTILL 5 DROPS INTO AFFECTED EAR(S) BY OTIC ROUTE 2 TIMES PER DAY x 10 DAYS, Disp: , Rfl:   •  gabapentin (NEURONTIN) 300 MG capsule, , Disp: , Rfl:   •  gabapentin (NEURONTIN) 300 MG capsule, 600 mg., Disp: , Rfl:   •  mirtazapine (REMERON) 15 MG tablet, 15 mg., Disp: , Rfl:   •  Multiple Vitamins-Minerals (MULTIVITAMIN ADULT PO), Take  by mouth., Disp: , Rfl:   •  nitrofurantoin, macrocrystal-monohydrate, (Macrobid) 100 MG capsule, Take 1 capsule by mouth 2 (Two) Times a Day., Disp: 14 capsule, Rfl: 5  •  triamcinolone (KENALOG) 0.1 % cream, Apply  topically to the appropriate area as directed 2 (Two) Times a Day., Disp: , Rfl:   •  gabapentin (NEURONTIN) 400 MG capsule, Take 400 mg by mouth 3 (three) times a day., Disp: , Rfl:   •  loteprednol (LOTEMAX) 0.5 % ophthalmic suspension, Lotemax 0.5 % eye drops,suspension  Four times a day, Disp: , Rfl:   •   MYRBETRIQ 50 MG tablet sustained-release 24 hour 24 hr tablet, TAKE 1 TABLET EVERY DAY, Disp: 90 tablet, Rfl: 3  •  oxybutynin XL (DITROPAN XL) 15 MG 24 hr tablet, Ditropan XL 15 mg tablet,extended release  Daily, Disp: , Rfl:   •  sulfamethoxazole-trimethoprim (BACTRIM) 400-80 MG tablet, Take 1 tablet by mouth 2 (Two) Times a Day., Disp: 20 tablet, Rfl: 0    Allergies:   Allergies   Allergen Reactions   • Latex Unknown - Low Severity   • Tape Rash       Objective     Physical Exam:   Vital Signs:   Visit Vitals  /80 (BP Location: Left arm, Patient Position: Sitting, Cuff Size: Adult)   Pulse 79   Temp 97.5 °F (36.4 °C) (Temporal)   SpO2 99%      There is no height or weight on file to calculate BMI.     Constitutional: NAD, WDWN.   Extremities: CARO x 2   Skin: Pink, warm and dry.  No rashes noted.  Psychiatric:  Normal mood and affect    Labs  Brief Urine Lab Results  (Last result in the past 365 days)      Color   Clarity   Blood   Leuk Est   Nitrite   Protein   CREAT   Urine HCG        07/26/22 1512 Straw   Clear   Negative   Negative   Negative   Trace                      Assessment / Plan      Assessment/Plan:   Diagnoses and all orders for this visit:    1. Neurogenic bladder (Primary)  -     POC Urinalysis Dipstick, Automated  -     Basic Metabolic Panel  -     nitrofurantoin, macrocrystal-monohydrate, (Macrobid) 100 MG capsule; Take 1 capsule by mouth 2 (Two) Times a Day.  Dispense: 14 capsule; Refill: 5    2. History of UTI  -     POC Urinalysis Dipstick, Automated  -     Basic Metabolic Panel  -     nitrofurantoin, macrocrystal-monohydrate, (Macrobid) 100 MG capsule; Take 1 capsule by mouth 2 (Two) Times a Day.  Dispense: 14 capsule; Refill: 5         1. Refilled Nitrofurantoin for recurrent UTI risk from self catheterization, patient does not need refills for catheters today will call when she needs.  Patient has right stark kidney check BMP     Follow Up:   Return in about 1 year (around  7/26/2023) for Next scheduled follow up.    PILAR Quesada  [facility]

## 2022-07-27 LAB
BUN SERPL-MCNC: 16 MG/DL (ref 6–20)
BUN/CREAT SERPL: 27.1 (ref 7–25)
CALCIUM SERPL-MCNC: 10.2 MG/DL (ref 8.6–10.5)
CHLORIDE SERPL-SCNC: 105 MMOL/L (ref 98–107)
CO2 SERPL-SCNC: 25.1 MMOL/L (ref 22–29)
CREAT SERPL-MCNC: 0.59 MG/DL (ref 0.57–1)
EGFRCR SERPLBLD CKD-EPI 2021: 106.6 ML/MIN/1.73
GLUCOSE SERPL-MCNC: 75 MG/DL (ref 65–99)
POTASSIUM SERPL-SCNC: 4.8 MMOL/L (ref 3.5–5.2)
SODIUM SERPL-SCNC: 141 MMOL/L (ref 136–145)

## 2022-07-28 ENCOUNTER — LAB (OUTPATIENT)
Dept: UROLOGY | Facility: CLINIC | Age: 57
End: 2022-07-28

## 2022-07-28 ENCOUNTER — TELEPHONE (OUTPATIENT)
Dept: UROLOGY | Facility: CLINIC | Age: 57
End: 2022-07-28

## 2022-07-28 DIAGNOSIS — N31.9 NEUROGENIC BLADDER: Primary | ICD-10-CM

## 2022-07-28 DIAGNOSIS — Z87.440 HISTORY OF UTI: ICD-10-CM

## 2022-07-28 DIAGNOSIS — R31.0 GROSS HEMATURIA: ICD-10-CM

## 2022-07-28 LAB
BILIRUB BLD-MCNC: NEGATIVE MG/DL
CLARITY, POC: ABNORMAL
COLOR UR: ABNORMAL
EXPIRATION DATE: ABNORMAL
GLUCOSE UR STRIP-MCNC: NEGATIVE MG/DL
KETONES UR QL: ABNORMAL
LEUKOCYTE EST, POC: ABNORMAL
Lab: ABNORMAL
NITRITE UR-MCNC: POSITIVE MG/ML
PH UR: 6 [PH] (ref 5–8)
PROT UR STRIP-MCNC: ABNORMAL MG/DL
RBC # UR STRIP: ABNORMAL /UL
SP GR UR: 1.03 (ref 1–1.03)
UROBILINOGEN UR QL: NORMAL

## 2022-07-28 PROCEDURE — 81003 URINALYSIS AUTO W/O SCOPE: CPT | Performed by: PHYSICIAN ASSISTANT

## 2022-08-08 DIAGNOSIS — R39.9 UTI SYMPTOMS: Primary | ICD-10-CM

## 2022-08-08 RX ORDER — CIPROFLOXACIN 250 MG/1
250 TABLET, FILM COATED ORAL 2 TIMES DAILY
Qty: 14 TABLET | Refills: 0 | Status: SHIPPED | OUTPATIENT
Start: 2022-08-08 | End: 2022-08-11

## 2022-08-08 RX ORDER — CIPROFLOXACIN 250 MG/1
250 TABLET, FILM COATED ORAL 2 TIMES DAILY
Qty: 14 TABLET | Refills: 0 | Status: SHIPPED | OUTPATIENT
Start: 2022-08-08 | End: 2022-08-08

## 2022-08-08 NOTE — TELEPHONE ENCOUNTER
2nd round of Macrobid has been completed, but patient stated as soon as it's completed the UTI symptoms return. Patient is requesting a different/stronger Abx. Please advise.

## 2022-08-10 ENCOUNTER — TELEPHONE (OUTPATIENT)
Dept: UROLOGY | Facility: CLINIC | Age: 57
End: 2022-08-10

## 2022-08-11 DIAGNOSIS — Z87.440 HISTORY OF UTI: Primary | ICD-10-CM

## 2022-08-11 RX ORDER — CEFDINIR 300 MG/1
300 CAPSULE ORAL 2 TIMES DAILY
Qty: 14 CAPSULE | Refills: 0 | Status: SHIPPED | OUTPATIENT
Start: 2022-08-11 | End: 2022-08-18

## 2022-08-11 NOTE — TELEPHONE ENCOUNTER
Pt called today to speak to Carol about Abx change from cipro to Omnicef. I confirmed new scrip was sent to [facility] and let patient know Carol was unavailable this afternoon.     Previous chart note stated she was experiencing jittery/anixous feeling and assumed it was contributed to Abx. Upon reviewing medications with patient, she stated she had been taking half of mirtazapine (REMERON) 15 MG tablet (for weight gain and restless sleep)  so approx. dosage of 7 MG but the evening before her jittery episode she took the full dosage of 15 MG.     I assured patient the Omnicef was an appropriate alternative based on Urine C&S. Patient requested a call from Carol to discuss changes, etc.

## 2022-08-11 NOTE — TELEPHONE ENCOUNTER
Called left message for Sosa on her self identifying VM discontinue the Cipro will send in cefdinir for her to take.

## 2022-08-18 ENCOUNTER — TELEPHONE (OUTPATIENT)
Dept: UROLOGY | Facility: CLINIC | Age: 57
End: 2022-08-18

## 2022-08-18 NOTE — TELEPHONE ENCOUNTER
Sent staff [employer] to Carol concerning symptoms:       Patient is experiencing redness,swelling, peeling of skin in labia area (majoria & minor) and she thinks it's the Omnicef. Patient reached out to Pharmacist and they advised to stop it due to reaction. Patient has three more doses left to complete therapy but she is discontinuing it now. Patient wanted to let you know and to make sure she is okay since Abx therapy is cut short. Patient called yesterday afternoon 8/17 but my message didn't get saved. I apologized to the patient for my error. Patient is willing to go back to [facility] or [facility] if necessary.    Please advise.    FYI-patient reported symptoms yesterday 8/17.

## 2022-08-24 ENCOUNTER — TELEPHONE (OUTPATIENT)
Dept: UROLOGY | Facility: CLINIC | Age: 57
End: 2022-08-24

## 2022-08-24 NOTE — TELEPHONE ENCOUNTER
I spoke to patient and relayed the medical information and patient agreed to finish cipro and drop off urine if symptoms have not improved.

## 2022-08-24 NOTE — TELEPHONE ENCOUNTER
Patient states the odor and urine sediment has returned after stopping the Omnicef so patient started taking cipro again, she took two doses now. Patient wants to know how to proceed.

## 2022-08-26 ENCOUNTER — TELEPHONE (OUTPATIENT)
Dept: OBSTETRICS AND GYNECOLOGY | Facility: CLINIC | Age: 57
End: 2022-08-26

## 2022-08-26 DIAGNOSIS — Z12.31 ENCOUNTER FOR SCREENING MAMMOGRAM FOR BREAST CANCER: Primary | ICD-10-CM

## 2022-08-26 NOTE — TELEPHONE ENCOUNTER
----- Message from Dari Parker sent at 8/26/2022  2:59 PM EDT -----  Regarding: MAMMOGRAM ORDER  Patient is requesting requesting a mammogram order. She isn't sure if she wants to schedule in the [facility] location or the [facility].

## 2022-11-09 ENCOUNTER — HOSPITAL ENCOUNTER (OUTPATIENT)
Dept: MAMMOGRAPHY | Facility: HOSPITAL | Age: 57
Discharge: HOME | End: 2022-11-09
Admitting: OBSTETRICS & GYNECOLOGY

## 2022-11-09 DIAGNOSIS — Z12.31 ENCOUNTER FOR SCREENING MAMMOGRAM FOR BREAST CANCER: ICD-10-CM

## 2022-11-09 PROCEDURE — 77063 BREAST TOMOSYNTHESIS BI: CPT

## 2022-11-09 PROCEDURE — 77067 SCR MAMMO BI INCL CAD: CPT

## 2023-04-06 ENCOUNTER — TRANSCRIBE ORDERS (OUTPATIENT)
Dept: ADMINISTRATIVE | Facility: HOSPITAL | Age: 58
End: 2023-04-06
Payer: MEDICARE

## 2023-04-06 DIAGNOSIS — N31.9 NEUROGENIC DYSFUNCTION OF THE URINARY BLADDER: ICD-10-CM

## 2023-04-06 DIAGNOSIS — Z90.5 ACQUIRED SOLITARY KIDNEY: Primary | ICD-10-CM

## 2023-04-20 ENCOUNTER — HOSPITAL ENCOUNTER (OUTPATIENT)
Dept: CT IMAGING | Facility: HOSPITAL | Age: 58
Discharge: HOME | End: 2023-04-20
Payer: MEDICARE

## 2023-04-20 DIAGNOSIS — N31.9 NEUROGENIC DYSFUNCTION OF THE URINARY BLADDER: ICD-10-CM

## 2023-04-20 DIAGNOSIS — Z90.5 ACQUIRED SOLITARY KIDNEY: ICD-10-CM

## 2023-04-20 PROCEDURE — 25510000001 IOPAMIDOL 61 % SOLUTION: Performed by: PHYSICAL MEDICINE & REHABILITATION

## 2023-04-20 PROCEDURE — 74178 CT ABD&PLV WO CNTR FLWD CNTR: CPT

## 2023-04-20 RX ADMIN — IOPAMIDOL 100 ML: 612 INJECTION, SOLUTION INTRAVENOUS at 17:48

## 2023-07-28 ENCOUNTER — OFFICE VISIT (OUTPATIENT)
Dept: UROLOGY | Facility: CLINIC | Age: 58
End: 2023-07-28
Payer: MEDICARE

## 2023-07-28 VITALS
BODY MASS INDEX: 20.49 KG/M2 | SYSTOLIC BLOOD PRESSURE: 126 MMHG | HEART RATE: 86 BPM | DIASTOLIC BLOOD PRESSURE: 78 MMHG | WEIGHT: 120 LBS | OXYGEN SATURATION: 97 % | TEMPERATURE: 98.6 F | HEIGHT: 64 IN

## 2023-07-28 DIAGNOSIS — Z87.440 HISTORY OF UTI: Primary | ICD-10-CM

## 2023-07-28 DIAGNOSIS — N31.9 NEUROGENIC BLADDER: ICD-10-CM

## 2023-07-28 LAB
BILIRUB BLD-MCNC: NEGATIVE MG/DL
CLARITY, POC: CLEAR
COLOR UR: YELLOW
EXPIRATION DATE: ABNORMAL
GLUCOSE UR STRIP-MCNC: NEGATIVE MG/DL
KETONES UR QL: NEGATIVE
LEUKOCYTE EST, POC: ABNORMAL
Lab: ABNORMAL
NITRITE UR-MCNC: NEGATIVE MG/ML
PH UR: 7 [PH] (ref 5–8)
PROT UR STRIP-MCNC: NEGATIVE MG/DL
RBC # UR STRIP: NEGATIVE /UL
SP GR UR: 1.01 (ref 1–1.03)
UROBILINOGEN UR QL: NORMAL

## 2023-07-28 RX ORDER — NITROFURANTOIN 25; 75 MG/1; MG/1
CAPSULE ORAL
Qty: 30 CAPSULE | Refills: 11 | Status: SHIPPED | OUTPATIENT
Start: 2023-07-28

## 2023-07-28 RX ORDER — FAMCICLOVIR 500 MG/1
1 TABLET ORAL EVERY 12 HOURS SCHEDULED
COMMUNITY
Start: 2023-07-12

## 2023-07-28 RX ORDER — NITROFURANTOIN 25; 75 MG/1; MG/1
100 CAPSULE ORAL 2 TIMES DAILY
COMMUNITY
End: 2023-07-28 | Stop reason: SDUPTHER

## 2023-07-28 NOTE — PROGRESS NOTES
Office Visit General Established Female Patient     Patient Name: Sosa De Luna  : 1965   MRN: [id number]     Chief Complaint:   Chief Complaint   Patient presents with    Follow-up     Yearly         Referring Provider: No ref. provider found    History of Present Illness: Sosa De Luna is a 57 y.o. female with history below in assessment, who presents for follow up.  Patient reports she uses her Macrobid after intercourse to prevent UTIs and will increase to twice daily for 3 to 7 days if UTI symptoms develop.  This is worked well for her no recent UTIs since her last visit.  She does continue daily self cathing at home and at work.    Subjective      Review of System:   As noted in HPI     Past Medical History:   Past Medical History:   Diagnosis Date    Anxiety     Neurogenic bladder     Renal disorder     left nephrectomy    Trauma     Urinary tract infection        Past Surgical History:   Past Surgical History:   Procedure Laterality Date    BARTHOLIN CYST MARSUPIALIZATION      CHOLECYSTECTOMY      GALLBLADDER SURGERY      NEPHRECTOMY Left        Family History:   Family History   Problem Relation Age of Onset    Hypertension Father     Thyroid cancer Mother     Hyperlipidemia Mother        Social History:   Social History     Socioeconomic History    Marital status:    Tobacco Use    Smoking status: Never    Smokeless tobacco: Never   Vaping Use    Vaping Use: Never used   Substance and Sexual Activity    Alcohol use: No    Drug use: No    Sexual activity: Defer       Medications:     Current Outpatient Medications:     Bacillus Coagulans-Inulin (Probiotic) 1-250 BILLION-MG capsule, Take 1 tablet by mouth Daily., Disp: , Rfl:     baclofen (LIORESAL) 20 MG tablet, Take 1 tablet by mouth Daily., Disp: , Rfl:     betamethasone valerate (VALISONE) 0.1 % cream, betamethasone valerate 0.1 % topical cream  APPLY A THIN LAYER TO THE AFFECTED AREA(S) BY TOPICAL ROUTE ONCE BID DAILY  "X 10 DAYS, Disp: , Rfl:     clotrimazole-betamethasone (LOTRISONE) 1-0.05 % cream,  1 betzy, Topical BID, 15 gm, Refill(s) 1, in each ear canal BID X 7 days, Route to Pharmacy Electronically, [facility] #[id number], 03/07/22 15:07:00 EST, Height/Length Dosing, cm, 54.9, 03/07/22 15:07:00 EST, Weight Dosing, kg, Disp: , Rfl:     conjugated estrogens (Premarin) 0.625 MG/GM vaginal cream, Use 0.5 grams intravaginally twice weekly to control symptoms., Disp: 1 each, Rfl: 11    cyclobenzaprine (FLEXERIL) 5 MG tablet, 1 tablet., Disp: , Rfl:     famciclovir (FAMVIR) 500 MG tablet, Take 1 tablet by mouth Every 12 (Twelve) Hours., Disp: , Rfl:     fluocinolone acetonide (DERMOTIC) 0.01 % oil otic oil, DermOtic Oil 0.01 % ear drops  INSTILL 5 DROPS INTO AFFECTED EAR(S) BY OTIC ROUTE 2 TIMES PER DAY x 10 DAYS, Disp: , Rfl:     gabapentin (NEURONTIN) 300 MG capsule, 2 capsules., Disp: , Rfl:     mirtazapine (REMERON) 15 MG tablet, 1 tablet. Pt noted she was taking half dosage until 8/9 and pt took full dosage., Disp: , Rfl:     Multiple Vitamins-Minerals (MULTIVITAMIN ADULT PO), Take  by mouth., Disp: , Rfl:     nitrofurantoin, macrocrystal-monohydrate, (MACROBID) 100 MG capsule, Take 1 as needed after intercourse to prevent UTI, Disp: 30 capsule, Rfl: 11    triamcinolone (KENALOG) 0.1 % cream, Apply  topically to the appropriate area as directed 2 (Two) Times a Day., Disp: , Rfl:     gabapentin (NEURONTIN) 300 MG capsule, , Disp: , Rfl:     gabapentin (NEURONTIN) 400 MG capsule, Take 400 mg by mouth 3 (three) times a day., Disp: , Rfl:     loteprednol (LOTEMAX) 0.5 % ophthalmic suspension, Lotemax 0.5 % eye drops,suspension  Four times a day, Disp: , Rfl:     Allergies:   Allergies   Allergen Reactions    Latex Unknown - Low Severity    Tape Rash       Objective     Physical Exam:   Vital Signs:   Visit Vitals  /78   Pulse 86   Temp 98.6 °F (37 °C)   Ht 162.6 cm (64\")   Wt 54.4 kg (120 lb)   SpO2 97%   BMI 20.60 kg/m² "      Body mass index is 20.6 kg/m².     Constitutional: NAD, WDWN.   Neurological: A + O x 3   Psychiatric:  Normal mood and affect    Labs  Brief Urine Lab Results  (Last result in the past 365 days)        Color   Clarity   Blood   Leuk Est   Nitrite   Protein   CREAT   Urine HCG        07/28/23 1447 Yellow   Clear   Negative   Small (1+)   Negative   Negative                   Lab Results   Component Value Date    GLUCOSE 75 07/26/2022    CALCIUM 10.2 07/26/2022     07/26/2022    K 4.8 07/26/2022    CO2 25.1 07/26/2022     07/26/2022    BUN 16 07/26/2022    CREATININE 0.59 07/26/2022    EGFRIFAFRI >150 03/21/2019    EGFRIFNONA 130 03/21/2019    BCR 27.1 (H) 07/26/2022       No results found for: WBC, HGB, HCT, MCV, PLT         .      I have reviewed the above labs.     Assessment / Plan      Assessment/Plan:   Diagnoses and all orders for this visit:    1. History of UTI (Primary)  -     POC Urinalysis Dipstick, Automated  -     nitrofurantoin, macrocrystal-monohydrate, (MACROBID) 100 MG capsule; Take 1 as needed after intercourse to prevent UTI  Dispense: 30 capsule; Refill: 11    2. Neurogenic bladder    57-year-old female with a history of neurogenic bladder and recurrent UTIs has discontinued all medications for incontinence she feels that she does better without these medicines.  She does use Macrobid as needed after intercourse and does well to prevent UTIs with this maintenance medication.  Patient does self cathing 4-5 times daily for neurogenic bladder.  She does use Closed system catheter [id number] quantity 20 for work for accessibility not for infection control and uses 100 straight catheters Magic 3 for home and does not need a closed system while she is at home.      Continue self cathing 4-5 times daily as needed  Continue Macrobid 1 after intercourse as needed    Follow Up:   Return in about 1 year (around 7/28/2024) for Follow up PILAR Marcus, NP-C  [facility] [place]  U[facility] [place]

## 2023-08-02 ENCOUNTER — TELEPHONE (OUTPATIENT)
Dept: UROLOGY | Facility: CLINIC | Age: 58
End: 2023-08-02

## 2023-08-02 NOTE — TELEPHONE ENCOUNTER
Caller: ERICA VALENZUELA    Relationship: SELF     Best call back number: [phone]    Patient is needing: PT REQUESTING CALLBACK FROM [facility].

## 2023-08-03 ENCOUNTER — TELEPHONE (OUTPATIENT)
Dept: UROLOGY | Facility: CLINIC | Age: 58
End: 2023-08-03

## 2023-08-03 NOTE — TELEPHONE ENCOUNTER
Caller: Sosa De Luna     Relationship to patient: SELF    Best call back number: [phone]     Patient is needing: PT RETURNING CALL TO [facility] ABOUT CATH SUPPLIES. STATED THAT THE ORDER WAS FAXED TO [employer] AND WOULD LIKE IT SENT TO [employer], THEIR PHONE NUMBER [phone]. PT DIDN'T HAVE FAX NUMBER. PLEASE CONTACT THE PT BACK IF ANYTHING ELSE IS NEEDED

## 2023-09-07 ENCOUNTER — OFFICE VISIT (OUTPATIENT)
Dept: OBSTETRICS AND GYNECOLOGY | Facility: CLINIC | Age: 58
End: 2023-09-07
Payer: MEDICARE

## 2023-09-07 VITALS
WEIGHT: 120 LBS | SYSTOLIC BLOOD PRESSURE: 122 MMHG | DIASTOLIC BLOOD PRESSURE: 70 MMHG | BODY MASS INDEX: 20.49 KG/M2 | HEIGHT: 64 IN

## 2023-09-07 DIAGNOSIS — N95.2 POSTMENOPAUSAL ATROPHIC VAGINITIS: ICD-10-CM

## 2023-09-07 DIAGNOSIS — N89.8 VAGINAL DISCHARGE: ICD-10-CM

## 2023-09-07 DIAGNOSIS — L90.0 LICHEN SCLEROSUS ET ATROPHICUS: ICD-10-CM

## 2023-09-07 DIAGNOSIS — N90.89 VULVAR IRRITATION: Primary | ICD-10-CM

## 2023-09-07 DIAGNOSIS — G82.20 LOWER PARAPLEGIA: ICD-10-CM

## 2023-09-07 PROCEDURE — 99214 OFFICE O/P EST MOD 30 MIN: CPT | Performed by: OBSTETRICS & GYNECOLOGY

## 2023-09-07 PROCEDURE — 1160F RVW MEDS BY RX/DR IN RCRD: CPT | Performed by: OBSTETRICS & GYNECOLOGY

## 2023-09-07 PROCEDURE — 1159F MED LIST DOCD IN RCRD: CPT | Performed by: OBSTETRICS & GYNECOLOGY

## 2023-09-07 RX ORDER — CLOBETASOL PROPIONATE 0.5 MG/G
OINTMENT TOPICAL
Qty: 60 G | Refills: 6 | Status: SHIPPED | OUTPATIENT
Start: 2023-09-07

## 2023-09-08 NOTE — PROGRESS NOTES
Chief Complaint  Vaginitis (Yeast infection/)     History of Present Illness:  Patient is 57 y.o. No obstetric history on file. who presents to [facility] here with complaints of vulvar irritation.  Patient reports she recently was on antibiotics for UTI.  She felt like she had a yeast infection.  She has noticed a discharge.  She reports having irritation of the vulva.  She has not noticed any itching as patient is a hemiplegic.  She has a neurogenic bladder as well.  Patient wears depends.  She reports however no changes in her depends.  She has not had any vaginal bleeding or spotting.    History  Past Medical History:   Diagnosis Date    Anxiety     Neurogenic bladder     Renal disorder     left nephrectomy    Trauma     Urinary tract infection      Current Outpatient Medications on File Prior to Visit   Medication Sig Dispense Refill    Bacillus Coagulans-Inulin (Probiotic) 1-250 BILLION-MG capsule Take 1 tablet by mouth Daily.      baclofen (LIORESAL) 20 MG tablet Take 1 tablet by mouth Daily.      betamethasone valerate (VALISONE) 0.1 % cream betamethasone valerate 0.1 % topical cream   APPLY A THIN LAYER TO THE AFFECTED AREA(S) BY TOPICAL ROUTE ONCE BID DAILY X 10 DAYS      clotrimazole-betamethasone (LOTRISONE) 1-0.05 % cream   1 betzy, Topical BID, 15 gm, Refill(s) 1, in each ear canal BID X 7 days, Route to Pharmacy Electronically, [facility] #[id number], 03/07/22 15:07:00 EST, Height/Length Dosing, cm, 54.9, 03/07/22 15:07:00 EST, Weight Dosing, kg      conjugated estrogens (Premarin) 0.625 MG/GM vaginal cream Use 0.5 grams intravaginally twice weekly to control symptoms. 1 each 11    cyclobenzaprine (FLEXERIL) 5 MG tablet 1 tablet.      famciclovir (FAMVIR) 500 MG tablet Take 1 tablet by mouth Every 12 (Twelve) Hours.      fluocinolone acetonide (DERMOTIC) 0.01 % oil otic oil DermOtic Oil 0.01 % ear drops   INSTILL 5 DROPS INTO AFFECTED EAR(S) BY OTIC ROUTE 2 TIMES PER DAY x 10  "DAYS      loteprednol (LOTEMAX) 0.5 % ophthalmic suspension Lotemax 0.5 % eye drops,suspension   Four times a day      mirtazapine (REMERON) 15 MG tablet 1 tablet. Pt noted she was taking half dosage until  and pt took full dosage.      Multiple Vitamins-Minerals (MULTIVITAMIN ADULT PO) Take  by mouth.      nitrofurantoin, macrocrystal-monohydrate, (MACROBID) 100 MG capsule Take 1 as needed after intercourse to prevent UTI 30 capsule 11    triamcinolone (KENALOG) 0.1 % cream Apply  topically to the appropriate area as directed 2 (Two) Times a Day.       No current facility-administered medications on file prior to visit.     Allergies   Allergen Reactions    Cefdinir Swelling    Latex Unknown - Low Severity    Tape Rash     Past Surgical History:   Procedure Laterality Date    BARTHOLIN CYST MARSUPIALIZATION      CHOLECYSTECTOMY      GALLBLADDER SURGERY      LAPAROSCOPIC CHOLECYSTECTOMY  ?    NEPHRECTOMY Left      Family History   Problem Relation Age of Onset    Hypertension Father     Thyroid cancer Mother     Hyperlipidemia Mother     Stroke Maternal Grandmother     Diabetes Maternal Grandmother     Prostate cancer Brother     Prostate cancer Maternal Uncle     Prostate cancer Paternal Uncle      Social History     Socioeconomic History    Marital status:    Tobacco Use    Smoking status: Former     Packs/day: 1.00     Years: 15.00     Pack years: 15.00     Types: Cigarettes     Quit date: 1995     Years since quittin.6    Smokeless tobacco: Never   Vaping Use    Vaping Use: Never used   Substance and Sexual Activity    Alcohol use: No    Drug use: No    Sexual activity: Yes     Partners: Male     Birth control/protection: Post-menopausal, Vasectomy       Physical Examination:  Vital Signs: /70   Ht 162.6 cm (64\")   Wt 54.4 kg (120 lb)   BMI 20.60 kg/m²     General Appearance: alert, appears stated age, and cooperative  Breasts: Not performed  Abdomen: no masses, no hepatomegaly, " no splenomegaly, soft non-tender, no guarding, and no rebound tenderness  Pelvic: Clinical staff was present for exam  External genitalia: Burnett of the vulva in an hourglass fashion  :  urethral meatus normal;  Vaginal:  atrophic mucosal changes are present; scant white discharge noted  Cervix:  absent.  Uterus:  absent.  Adnexa:  non palpable bilaterally.  Cultures obtained.    Data Review:  The following data was reviewed by: Chantell Argueta MD on 09/07/2023:     Labs:  POC Urinalysis Dipstick, Automated (07/28/2023 14:47)   Imaging:    Medical Records:  None    Assessment and Plan   1. Vulvar irritation  Patient with vulvar irritation as noted.  Cultures have been obtained as well.  Prescription is given for clobetasol ointment as noted.  - clobetasol (TEMOVATE) 0.05 % ointment; Apply to affected area BID x 2 wks then daily x 2 wks then 2-3x/wk  Dispense: 60 g; Refill: 6  - NuSwab VG+ - Swab, Vagina    2. Lichen sclerosus et atrophicus  The condition of lichen sclerosus was discussed with patient including the benign, chronic, progressive nature of the dermatologic condition.  Signs and symptoms were reviewed.  The patient was informed the only definitive diagnosis is pathologic with a vulvar biopsy.  The expectations of the disease was discussed including the chronicity with frequent recurrences and remissions.  Vulvar hygiene was discussed including conditions to avoid.  Treatment with a topical steroid, clobetasol 0.05% ointment, was discussed with instructions and precautions for use discussed.   Patient is to follow-up as noted.  If no improvement then she will need a vulvar biopsy.  - clobetasol (TEMOVATE) 0.05 % ointment; Apply to affected area BID x 2 wks then daily x 2 wks then 2-3x/wk  Dispense: 60 g; Refill: 6    3. Postmenopausal atrophic vaginitis  Discussed various options for relief of atrophic vaginal symptoms related to menopause. Discussed local therapy for treatment of vaginal symptoms  only.  Discussed the different formulation options including cream, ring, and tablets.  Discussed the low risk of systemic absorption in postmenopausal women with atrophy using 25 mcgs of estradiol on a daily basis.  Recommend low dose use 2-3x/wk for maintenance of treatment.  Other treatment options were discussed including the use of water-based and silicone-based vaginal lubricants and moisturizers.  Also discussed was the FDA approved treatment option of ospemifene for moderate to severe dyspareunia.     4. Lower paraplegia  Patient with limited sensation and secondary to being a paraplegic.  Patient with irritation as noted.  Patient also with neurogenic bladder.  Patient does wear depends regularly.  She also sits in a wheelchair.  No evidence of pressure ulcerations.    5. Vaginal discharge  Scant vaginal discharge noted.  Cultures obtained.  Plan pending results.    Follow Up/Instructions:  Follow up as noted.  Patient was given instructions and counseling regarding her condition or for health maintenance advice. Please see specific information pulled into the AVS if appropriate.     Note: Speech recognition transcription software may have been used to dictate portions of this document.  An attempt at proofreading has been made though minor errors in transcription may still be present.    This note was electronically signed.  Chantell Argueta M.D.

## 2023-09-12 LAB
A VAGINAE DNA VAG QL NAA+PROBE: NORMAL SCORE
BVAB2 DNA VAG QL NAA+PROBE: NORMAL SCORE
C ALBICANS DNA VAG QL NAA+PROBE: NEGATIVE
C GLABRATA DNA VAG QL NAA+PROBE: NEGATIVE
C TRACH DNA VAG QL NAA+PROBE: NEGATIVE
MEGA1 DNA VAG QL NAA+PROBE: NORMAL SCORE
N GONORRHOEA DNA VAG QL NAA+PROBE: NEGATIVE
T VAGINALIS DNA VAG QL NAA+PROBE: NEGATIVE

## 2023-09-13 ENCOUNTER — DOCUMENTATION (OUTPATIENT)
Dept: UROLOGY | Facility: CLINIC | Age: 58
End: 2023-09-13
Payer: MEDICARE

## 2023-09-13 NOTE — PROGRESS NOTES
Spoke to [facility] medical exceptions board they report patient's insurance does not require prior authorization when I discussed she would like to have 20 closed system catheters monthly and 100 open system catheters monthly they informed me they could not approve the exception because patient insurance does not require prior authorizations.

## 2024-02-26 ENCOUNTER — TELEPHONE (OUTPATIENT)
Dept: UROLOGY | Facility: CLINIC | Age: 59
End: 2024-02-26
Payer: MEDICARE

## 2024-02-26 NOTE — TELEPHONE ENCOUNTER
Pt wants to see where we stand on getting a new prescription for her caths, stating she has a new cath company that she is using and requested and new script.

## 2024-02-27 ENCOUNTER — TELEPHONE (OUTPATIENT)
Dept: UROLOGY | Facility: CLINIC | Age: 59
End: 2024-02-27
Payer: MEDICARE

## 2024-02-27 NOTE — TELEPHONE ENCOUNTER
Spoke with patient and her cath company has changed to [employer]. Patient needs script for 360 caths, Hydrophillic, 60inch, 12fr. Order number off cath per pt is [id number].

## 2024-02-27 NOTE — TELEPHONE ENCOUNTER
Okay to inform pt I sent in rx to her pharmacy.  
Patient states that at her last visit she doesn't remember discussing any other options for atrophy other than the cream.  She states that her Urologist is recommending the Premarin cream also.  She is wondering if this will benefit her?  
Patient would like to try the estrogen cream.    [facility].  
Yes okay to inform patient I do recommend that she use the estrogen cream.  I can send in a prescription or her urologist.  
0

## 2024-02-29 ENCOUNTER — TELEPHONE (OUTPATIENT)
Dept: UROLOGY | Facility: CLINIC | Age: 59
End: 2024-02-29
Payer: MEDICARE

## 2024-09-10 ENCOUNTER — OFFICE VISIT (OUTPATIENT)
Dept: UROLOGY | Facility: CLINIC | Age: 59
End: 2024-09-10
Payer: MEDICARE

## 2024-09-10 VITALS
WEIGHT: 125 LBS | HEIGHT: 64 IN | DIASTOLIC BLOOD PRESSURE: 80 MMHG | OXYGEN SATURATION: 98 % | SYSTOLIC BLOOD PRESSURE: 126 MMHG | HEART RATE: 74 BPM | BODY MASS INDEX: 21.34 KG/M2

## 2024-09-10 DIAGNOSIS — Z87.440 HISTORY OF UTI: ICD-10-CM

## 2024-09-10 DIAGNOSIS — N31.9 NEUROGENIC BLADDER: Primary | ICD-10-CM

## 2024-09-10 LAB
CLARITY, POC: CLEAR
COLOR UR: YELLOW
EXPIRATION DATE: NORMAL
GLUCOSE UR STRIP-MCNC: NEGATIVE MG/DL
KETONES UR QL: NEGATIVE
LEUKOCYTE EST, POC: NEGATIVE
Lab: NORMAL
NITRITE UR-MCNC: NEGATIVE MG/ML
PH UR: 7 [PH] (ref 5–8)
PROT UR STRIP-MCNC: NEGATIVE MG/DL
RBC # UR STRIP: NEGATIVE /UL
SP GR UR: 1.01 (ref 1–1.03)
UROBILINOGEN UR QL: NORMAL

## 2024-09-10 RX ORDER — FLUCONAZOLE 150 MG/1
TABLET ORAL
COMMUNITY
Start: 2024-08-23

## 2024-09-10 RX ORDER — GABAPENTIN 300 MG/1
600 CAPSULE ORAL 3 TIMES DAILY
COMMUNITY
Start: 2024-07-15

## 2024-09-10 RX ORDER — CIPROFLOXACIN 250 MG/1
TABLET, FILM COATED ORAL
COMMUNITY
Start: 2024-06-10

## 2024-09-10 RX ORDER — NITROFURANTOIN 25; 75 MG/1; MG/1
CAPSULE ORAL
Qty: 30 CAPSULE | Refills: 11 | Status: SHIPPED | OUTPATIENT
Start: 2024-09-10

## 2024-09-10 RX ORDER — CIPROFLOXACIN 500 MG/1
TABLET, FILM COATED ORAL
COMMUNITY
Start: 2024-08-21
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
## 2024-10-08 ENCOUNTER — TELEMEDICINE (OUTPATIENT)
Dept: UROLOGY | Facility: CLINIC | Age: 59
End: 2024-10-08
Payer: MEDICARE

## 2024-10-08 DIAGNOSIS — B37.31 CANDIDAL VULVITIS: ICD-10-CM

## 2024-10-08 DIAGNOSIS — N39.0 ACUTE UTI (URINARY TRACT INFECTION): Primary | ICD-10-CM

## 2024-10-08 PROBLEM — M79.2 NEUROPATHIC PAIN: Status: ACTIVE | Noted: 2017-02-27

## 2024-10-08 PROBLEM — Z90.5 SOLITARY KIDNEY, ACQUIRED: Status: ACTIVE | Noted: 2017-02-27

## 2024-10-08 PROCEDURE — 1160F RVW MEDS BY RX/DR IN RCRD: CPT | Performed by: NURSE PRACTITIONER

## 2024-10-08 PROCEDURE — 99214 OFFICE O/P EST MOD 30 MIN: CPT | Performed by: NURSE PRACTITIONER

## 2024-10-08 PROCEDURE — 1159F MED LIST DOCD IN RCRD: CPT | Performed by: NURSE PRACTITIONER

## 2024-10-08 RX ORDER — CIPROFLOXACIN 500 MG/1
500 TABLET, FILM COATED ORAL 2 TIMES DAILY
Qty: 14 TABLET | Refills: 0 | Status: SHIPPED | OUTPATIENT
Start: 2024-10-08 | End: 2024-10-15

## 2024-10-08 RX ORDER — FLUCONAZOLE 150 MG/1
TABLET ORAL
Qty: 2 TABLET | Refills: 0 | Status: SHIPPED | OUTPATIENT
Start: 2024-10-08

## 2024-10-08 NOTE — PROGRESS NOTES
Video Visit     Patient Name: Sosa De Luna  : 1965   MRN: [id number]     Chief Complaint: No chief complaint on file.      History of Present Illness: Sosa De Luna is a 59 y.o. female who presents today for follow up of UTI symptoms and urine drop off.  Patient does daily CIC and yesterday started having a large amount of mucus in urine with sediment.  Due to neurologic issues patient does not develop symptoms of dysuria.    Patient agrees to visit via video and is located in the [facility] at work. Visit was converted to phone for sound, video worked but patient was unable to hear.       Subjective      Review of System:   As noted in HPI    Medications:     Current Outpatient Medications:     Bacillus Coagulans-Inulin (Probiotic) 1-250 BILLION-MG capsule, Take 1 tablet by mouth Daily., Disp: , Rfl:     baclofen (LIORESAL) 20 MG tablet, Take 1 tablet by mouth Daily., Disp: , Rfl:     betamethasone valerate (VALISONE) 0.1 % cream, betamethasone valerate 0.1 % topical cream  APPLY A THIN LAYER TO THE AFFECTED AREA(S) BY TOPICAL ROUTE ONCE BID DAILY X 10 DAYS, Disp: , Rfl:     ciprofloxacin (Cipro) 500 MG tablet, Take 1 tablet by mouth 2 (Two) Times a Day for 7 days., Disp: 14 tablet, Rfl: 0    clobetasol (TEMOVATE) 0.05 % ointment, Apply to affected area BID x 2 wks then daily x 2 wks then 2-3x/wk, Disp: 60 g, Rfl: 6    clotrimazole-betamethasone (LOTRISONE) 1-0.05 % cream,  1 betzy, Topical BID, 15 gm, Refill(s) 1, in each ear canal BID X 7 days, Route to Pharmacy Electronically, [id number], 22 15:07:00 EST, Height/Length Dosing, cm, 54.9, 22 15:07:00 EST, Weight Dosing, kg, Disp: , Rfl:     conjugated estrogens (Premarin) 0.625 MG/GM vaginal cream, Use 0.5 grams intravaginally twice weekly to control symptoms., Disp: 1 each, Rfl: 11    cyclobenzaprine (FLEXERIL) 5 MG tablet, 1 tablet., Disp: , Rfl:     famciclovir (FAMVIR) 500 MG tablet, Take 1 tablet by mouth  Every 12 (Twelve) Hours., Disp: , Rfl:     fluconazole (DIFLUCAN) 150 MG tablet, Take 1 pill today and 1 when finishing antibiotics, Disp: 2 tablet, Rfl: 0    fluocinolone acetonide (DERMOTIC) 0.01 % oil otic oil, DermOtic Oil 0.01 % ear drops  INSTILL 5 DROPS INTO AFFECTED EAR(S) BY OTIC ROUTE 2 TIMES PER DAY x 10 DAYS (Patient not taking: Reported on 9/10/2024), Disp: , Rfl:     gabapentin (NEURONTIN) 300 MG capsule, Take 2 capsules by mouth 3 (Three) Times a Day., Disp: , Rfl:     mirtazapine (REMERON) 15 MG tablet, 1 tablet. Pt noted she was taking half dosage until 8/9 and pt took full dosage., Disp: , Rfl:     Multiple Vitamins-Minerals (MULTIVITAMIN ADULT PO), Take  by mouth., Disp: , Rfl:     nitrofurantoin, macrocrystal-monohydrate, (MACROBID) 100 MG capsule, Take 1 as needed after intercourse to prevent UTI, Disp: 30 capsule, Rfl: 11    triamcinolone (KENALOG) 0.1 % cream, Apply  topically to the appropriate area as directed 2 (Two) Times a Day., Disp: , Rfl:     Allergies:   Allergies   Allergen Reactions    Cefdinir Swelling    Latex Unknown - Low Severity    Tape Rash    Wound Dressing Adhesive Other (See Comments)       Objective     Physical Exam:   Vital Signs: There were no vitals filed for this visit.  There is no height or weight on file to calculate BMI.     Physical Exam  Constitutional:       Appearance: Normal appearance.   Neurological:      Mental Status: She is alert.          Labs:   Brief Urine Lab Results  (Last result in the past 365 days)        Color   Clarity   Blood   Leuk Est   Nitrite   Protein   CREAT   Urine HCG        09/10/24 1524 Yellow   Clear   Negative   Negative   Negative   Negative                        Lab Results   Component Value Date    GLUCOSE 75 07/26/2022    CALCIUM 10.2 07/26/2022     07/26/2022    K 4.8 07/26/2022    CO2 25.1 07/26/2022     07/26/2022    BUN 16 07/26/2022    CREATININE 0.59 07/26/2022    EGFRIFAFRI >150 03/21/2019    EGFRIFNONA 130  "03/21/2019    BCR 27.1 (H) 07/26/2022       No results found for: \"WBC\", \"HGB\", \"HCT\", \"MCV\", \"PLT\"    Images:   No Images in the past 120 days found..    Assessment / Plan      Assessment/Plan:   Diagnoses and all orders for this visit:    1. Acute UTI (urinary tract infection) (Primary)  -     ciprofloxacin (Cipro) 500 MG tablet; Take 1 tablet by mouth 2 (Two) Times a Day for 7 days.  Dispense: 14 tablet; Refill: 0    2. Candidal vulvitis  -     fluconazole (DIFLUCAN) 150 MG tablet; Take 1 pill today and 1 when finishing antibiotics  Dispense: 2 tablet; Refill: 0       59-year-old female with a history of neurogenic bladder and CIC dropped urine off yesterday UA shows trace amount of blood and large leukocytes.  She has a history of UTIs due to CIC and increased risk unfortunately urine culture was not obtained urine was dropped off at 4:15 and ordered did not get submitted.  Will treat with Cipro 500 mg twice daily x 7 days and fluconazole for yeast she develops with antibiotics.  If patient has no improvements in symptoms she will call office and schedule in person visit to repeat urinalysis and obtain urine culture  Start Cipro 500 mg twice daily x 7 days  Start Diflucan 1 today and 1 when finished with antibiotics if needed      Follow Up:   Return for Next scheduled follow up.    PILAR Quesada, NP-C  [facility] [place] [facility] [place]     "

## 2024-12-03 ENCOUNTER — TELEMEDICINE (OUTPATIENT)
Dept: UROLOGY | Facility: CLINIC | Age: 59
End: 2024-12-03
Payer: MEDICARE

## 2024-12-03 DIAGNOSIS — N39.0 ACUTE UTI (URINARY TRACT INFECTION): Primary | ICD-10-CM

## 2024-12-03 PROCEDURE — 1159F MED LIST DOCD IN RCRD: CPT | Performed by: NURSE PRACTITIONER

## 2024-12-03 PROCEDURE — 1160F RVW MEDS BY RX/DR IN RCRD: CPT | Performed by: NURSE PRACTITIONER

## 2024-12-03 PROCEDURE — 99214 OFFICE O/P EST MOD 30 MIN: CPT | Performed by: NURSE PRACTITIONER

## 2024-12-03 RX ORDER — CIPROFLOXACIN 500 MG/1
500 TABLET, FILM COATED ORAL 2 TIMES DAILY
Qty: 14 TABLET | Refills: 0 | Status: SHIPPED | OUTPATIENT
Start: 2024-12-03 | End: 2024-12-10

## 2024-12-03 NOTE — PROGRESS NOTES
Video Visit     Patient Name: Sosa De Luna  : 1965   MRN: [id number]       History of Present Illness: Sosa De Luna is a 59 y.o. female who presents today for follow up of UTI   Video visit conducted using video and audio.    Patient agrees to visit via video and is located in the [facility] at home   Location of provider: [facility]  Patient has chosen to receive care through a telehealth visit.  No technical issues occurred during this visit.       Subjective      Review of System:   As noted in HPI    Medications:     Current Outpatient Medications:     Bacillus Coagulans-Inulin (Probiotic) 1-250 BILLION-MG capsule, Take 1 tablet by mouth Daily., Disp: , Rfl:     baclofen (LIORESAL) 20 MG tablet, Take 1 tablet by mouth Daily., Disp: , Rfl:     betamethasone valerate (VALISONE) 0.1 % cream, betamethasone valerate 0.1 % topical cream  APPLY A THIN LAYER TO THE AFFECTED AREA(S) BY TOPICAL ROUTE ONCE BID DAILY X 10 DAYS, Disp: , Rfl:     ciprofloxacin (Cipro) 500 MG tablet, Take 1 tablet by mouth 2 (Two) Times a Day for 7 days., Disp: 14 tablet, Rfl: 0    clobetasol (TEMOVATE) 0.05 % ointment, Apply to affected area BID x 2 wks then daily x 2 wks then 2-3x/wk, Disp: 60 g, Rfl: 6    clotrimazole-betamethasone (LOTRISONE) 1-0.05 % cream,  1 betzy, Topical BID, 15 gm, Refill(s) 1, in each ear canal BID X 7 days, Route to Pharmacy Electronically, [id number], [id number], 22 15:07:00 EST, Height/Length Dosing, cm, 54.9, 22 15:07:00 EST, Weight Dosing, kg, Disp: , Rfl:     conjugated estrogens (Premarin) 0.625 MG/GM vaginal cream, Use 0.5 grams intravaginally twice weekly to control symptoms., Disp: 1 each, Rfl: 11    cyclobenzaprine (FLEXERIL) 5 MG tablet, 1 tablet., Disp: , Rfl:     famciclovir (FAMVIR) 500 MG tablet, Take 1 tablet by mouth Every 12 (Twelve) Hours., Disp: , Rfl:     fluconazole (DIFLUCAN) 150 MG tablet, Take 1 pill today and 1 when finishing antibiotics, Disp:  2 tablet, Rfl: 0    fluocinolone acetonide (DERMOTIC) 0.01 % oil otic oil, DermOtic Oil 0.01 % ear drops  INSTILL 5 DROPS INTO AFFECTED EAR(S) BY OTIC ROUTE 2 TIMES PER DAY x 10 DAYS (Patient not taking: Reported on 9/10/2024), Disp: , Rfl:     gabapentin (NEURONTIN) 300 MG capsule, Take 2 capsules by mouth 3 (Three) Times a Day., Disp: , Rfl:     mirtazapine (REMERON) 15 MG tablet, 1 tablet. Pt noted she was taking half dosage until 8/9 and pt took full dosage., Disp: , Rfl:     Multiple Vitamins-Minerals (MULTIVITAMIN ADULT PO), Take  by mouth., Disp: , Rfl:     nitrofurantoin, macrocrystal-monohydrate, (MACROBID) 100 MG capsule, Take 1 as needed after intercourse to prevent UTI, Disp: 30 capsule, Rfl: 11    triamcinolone (KENALOG) 0.1 % cream, Apply  topically to the appropriate area as directed 2 (Two) Times a Day., Disp: , Rfl:     Allergies:   Allergies   Allergen Reactions    Cefdinir Swelling    Latex Unknown - Low Severity    Tape Rash    Wound Dressing Adhesive Other (See Comments)       Objective     Physical Exam:   Vital Signs: There were no vitals filed for this visit.  There is no height or weight on file to calculate BMI.     Physical Exam  Constitutional:       Appearance: Normal appearance. She is not ill-appearing.   Neurological:      Mental Status: She is alert.   Psychiatric:         Mood and Affect: Mood normal.         Behavior: Behavior normal.          Labs:   Brief Urine Lab Results  (Last result in the past 365 days)        Color   Clarity   Blood   Leuk Est   Nitrite   Protein   CREAT   Urine HCG        09/10/24 1524 Yellow   Clear   Negative   Negative   Negative   Negative                        Lab Results   Component Value Date    GLUCOSE 75 07/26/2022    CALCIUM 10.2 07/26/2022     07/26/2022    K 4.8 07/26/2022    CO2 25.1 07/26/2022     07/26/2022    BUN 16 07/26/2022    CREATININE 0.59 07/26/2022    EGFRIFAFRI >150 03/21/2019    EGFRIFNONA 130 03/21/2019    BCR 27.1  "(H) 07/26/2022       No results found for: \"WBC\", \"HGB\", \"HCT\", \"MCV\", \"PLT\"    Images:   No Images in the past 120 days found..    Assessment / Plan      Assessment/Plan:   Diagnoses and all orders for this visit:    1. Acute UTI (urinary tract infection) (Primary)  -     Urine Culture - Urine, Urine, Clean Catch  -     ciprofloxacin (Cipro) 500 MG tablet; Take 1 tablet by mouth 2 (Two) Times a Day for 7 days.  Dispense: 14 tablet; Refill: 0    59-year-old female who has developed recent UTI symptoms patient does chronic CIC and is at high risk urine was dropped off today positive for nitrites and moderate leukocytes.  Will empirically start ciprofloxacin while awaiting culture results and guidance UTI.  Will modify treatment plan per results if necessary.    Start Cipro 500 mg two times daily for 7 days  Obtain Urine culture and guidance UTI today       Follow Up:   Return for Next scheduled follow up.    PILAR Quesada, NP-C  [facility] [place] [facility] [place]     "

## 2024-12-06 ENCOUNTER — TELEPHONE (OUTPATIENT)
Dept: UROLOGY | Facility: CLINIC | Age: 59
End: 2024-12-06
Payer: MEDICARE

## 2024-12-06 NOTE — TELEPHONE ENCOUNTER
Attempted to contact Ms. De Luna.  Left voicemail to let her know that her urine culture was positive for E. Coli and to continue the antibiotic as prescribed.

## 2024-12-07 LAB
BACTERIA UR CULT: ABNORMAL
BACTERIA UR CULT: ABNORMAL
OTHER ANTIBIOTIC SUSC ISLT: ABNORMAL

## 2025-02-03 ENCOUNTER — TELEPHONE (OUTPATIENT)
Dept: UROLOGY | Facility: CLINIC | Age: 60
End: 2025-02-03
Payer: MEDICARE

## 2025-02-03 NOTE — TELEPHONE ENCOUNTER
[facility] is calling stating they sent over a paper needing information on patient for her Catheter Order

## 2025-02-05 NOTE — TELEPHONE ENCOUNTER
JAX FROM [employer] CALLED TO STATED THAT THE FAX SENT WAS MISSING CORRECT DX. ON SCRIPT NEEDS TO STATE DX AS R33.9 AND THEN THEN FAX NOTE BACK TO THEM. AFTER GETTING THEN THIS CAN BE APPROVED MAKE SURE PLEASE THAT IT IS SIGNED AND DATED. THANK YOU

## 2025-04-07 ENCOUNTER — TELEPHONE (OUTPATIENT)
Dept: UROLOGY | Facility: CLINIC | Age: 60
End: 2025-04-07
Payer: MEDICARE

## 2025-04-07 NOTE — TELEPHONE ENCOUNTER
HUB TO RELAY:  Tried calling patient; pt dropped off a urine sample without an appointment or a phone call. It is [facility] (providers) policy for patient that have UTI symptoms to call our clinic and we will work something out for the patient to do a urine sample and at the very least a MyChart visit or an in office visit same or next day, depending upon our schedule and availability. Pt must call and make an appointment, dropped off urine samples without an appointment and leaving will not be checked.

## 2025-05-02 ENCOUNTER — TELEPHONE (OUTPATIENT)
Dept: UROLOGY | Facility: CLINIC | Age: 60
End: 2025-05-02

## 2025-05-02 NOTE — TELEPHONE ENCOUNTER
Provider: TAE GONZALEZ    Caller: Sosa De Luna    Relationship to Patient: Self      Reason for Call: PT HAS CALLED AND REQUESTED TO SPEAK TO AN  IN REGARDS TO CATH SUPPLIES.    PT HAS REQUESTED THAT [facility] BE CONTACTED TO GET SUPPLIES TAKEN CARE OF.     UNABLE TO WARM MARRERO TO NON CLINICAL OR CLINICAL LINE.

## 2025-05-07 NOTE — TELEPHONE ENCOUNTER
Follow up in wound clinic in 2 weeks    If you have questions or concerns about your wounds/wound care please call the wound clinic at [phone]    1. Wash your hands with hand  or liquid soap and water.  2. Set up your supplies needed for your wound care and dressing change on a clean surface.  3. Remove old/soiled dressing and discard, touching only the corners or top of the dressing, avoid touching the inside of the dressing.  4. Remove all wound dressings and packing to prevent an infection.  5. Repeat washing your hands with hand  or liquid soap and water.  6. Wash your wound with water using clean washcloth, paper towel or gauze; moisten with water and clean wound by wiping over the area.  Then using another clean paper towel, gauze or another side of the washcloth, clean area around the wound.    You are able to shower, remove old dressings prior to showering. Cleanse your wound with water while you are in the shower.      WOUND TREATMENT   Change your dressing every other day  Apply the following to your wound bed: iodoflex/iodosorb  Cover/wrap with: bandaid       Call your healthcare provider right away if you have any of the following:  · Shaking chills or fever above 100.4°F (38°C)  · Bleeding that soaks the dressing  · Stitches that are pulling away from the wound or pulling apart  · Increased drainage from the wound or drainage that is yellow-green, or smelly  · Increased swelling, pain, or redness in the skin around the wound  · An increased size of the wound  · Increased fatigue  · Loss of appetite       Wanted to check in on this now for the patient. Did that form work out ok?

## 2025-05-19 ENCOUNTER — TELEPHONE (OUTPATIENT)
Dept: UROLOGY | Facility: CLINIC | Age: 60
End: 2025-05-19
Payer: MEDICARE

## 2025-05-19 NOTE — TELEPHONE ENCOUNTER
Patient called and left  with nurse hotline and stated she has UTI symptoms. She wants to drop off specimen and do MYchart visit with Carol Mckeon. I called patient and let her know Carol is at satellite clinic.     2 days odor and sediment, incontinence. Patient denies any fever or chills. Patient stated one of her propylitic abx today as well.       I called back and let Sosa know Carol will return tomorrow morning and she will advise about urine drop off and mychart video visit.  She understood.

## 2025-05-20 ENCOUNTER — TELEMEDICINE (OUTPATIENT)
Dept: UROLOGY | Facility: CLINIC | Age: 60
End: 2025-05-20
Payer: MEDICARE

## 2025-05-20 DIAGNOSIS — N39.0 ACUTE UTI (URINARY TRACT INFECTION): Primary | ICD-10-CM

## 2025-05-20 LAB
BILIRUB BLD-MCNC: NEGATIVE MG/DL
CLARITY, POC: ABNORMAL
COLOR UR: YELLOW
EXPIRATION DATE: ABNORMAL
GLUCOSE UR STRIP-MCNC: NEGATIVE MG/DL
KETONES UR QL: NEGATIVE
LEUKOCYTE EST, POC: ABNORMAL
Lab: ABNORMAL
NITRITE UR-MCNC: POSITIVE MG/ML
PH UR: 6 [PH] (ref 5–8)
PROT UR STRIP-MCNC: ABNORMAL MG/DL
RBC # UR STRIP: ABNORMAL /UL
SP GR UR: 1.01 (ref 1–1.03)
UROBILINOGEN UR QL: ABNORMAL

## 2025-05-20 RX ORDER — BACLOFEN 20 MG/1
40 TABLET ORAL NIGHTLY
COMMUNITY
Start: 2025-04-28 | End: 2026-04-28

## 2025-05-20 RX ORDER — CIPROFLOXACIN 250 MG/1
250 TABLET, FILM COATED ORAL 2 TIMES DAILY
Qty: 14 TABLET | Refills: 0 | Status: SHIPPED | OUTPATIENT
Start: 2025-05-20 | End: 2025-05-27

## 2025-05-20 NOTE — PROGRESS NOTES
Office Visit     Patient Name: Sosa De Luna  : 1965   MRN: [id number]     Patient or patient representative verbalized consent for the use of Ambient Listening during the visit with  PILAR Chaney for chart documentation. 2025  16:42 EDT    Chief Complaint: No chief complaint on file.    Referring Provider: No ref. provider found    Primary Care Provider: Bee Alfredo DO     History of Present Illness  The patient presents via virtual visit for evaluation of a urinary tract infection (UTI).    UTI  - Reports malodorous urine  - Reports urinary leakage, consistent with previous symptoms    Nausea  - Experienced nausea earlier today, possibly related to urinary symptoms or concurrent sinus issue    Video visit conducted using video and audio.  Connected via [employer] Epic Video.   Location of patient: At home in [place]  Location of provider: [facility] office in [place], [place]  Patient has chosen to receive care through a telehealth visit.  The patient has signed the video visit consent form prior to starting visit and gave verbal consent prior to starting visit.   No technical issues occurred during this visit.       Subjective   Review of System:   As noted in HPI.    Past Medical History:   Diagnosis Date    Anxiety     Neurogenic bladder     Renal disorder     left nephrectomy    Trauma     Urinary tract infection      Past Surgical History:   Procedure Laterality Date    BARTHOLIN CYST MARSUPIALIZATION      CHOLECYSTECTOMY      GALLBLADDER SURGERY      LAPAROSCOPIC CHOLECYSTECTOMY  ?    NEPHRECTOMY Left      Family History   Problem Relation Age of Onset    Hypertension Father     Thyroid cancer Mother     Hyperlipidemia Mother     Stroke Maternal Grandmother     Diabetes Maternal Grandmother     Prostate cancer Brother     Prostate cancer Maternal Uncle     Prostate cancer Paternal Uncle      Social History     Socioeconomic History    Marital status:     Tobacco Use    Smoking status: Former     Current packs/day: 0.00     Average packs/day: 1 pack/day for 15.0 years (15.0 ttl pk-yrs)     Types: Cigarettes     Start date: 1980     Quit date: 1995     Years since quittin.3    Smokeless tobacco: Never   Vaping Use    Vaping status: Never Used   Substance and Sexual Activity    Alcohol use: No    Drug use: No    Sexual activity: Yes     Partners: Male     Birth control/protection: Post-menopausal, Vasectomy       Current Outpatient Medications:     baclofen (LIORESAL) 20 MG tablet, Take 2 tablets by mouth Every Night., Disp: , Rfl:     Bacillus Coagulans-Inulin (Probiotic) 1-250 BILLION-MG capsule, Take 1 tablet by mouth Daily., Disp: , Rfl:     baclofen (LIORESAL) 20 MG tablet, Take 1 tablet by mouth Daily., Disp: , Rfl:     betamethasone valerate (VALISONE) 0.1 % cream, betamethasone valerate 0.1 % topical cream  APPLY A THIN LAYER TO THE AFFECTED AREA(S) BY TOPICAL ROUTE ONCE BID DAILY X 10 DAYS, Disp: , Rfl:     ciprofloxacin (Cipro) 250 MG tablet, Take 1 tablet by mouth 2 (Two) Times a Day for 7 days., Disp: 14 tablet, Rfl: 0    clobetasol (TEMOVATE) 0.05 % ointment, Apply to affected area BID x 2 wks then daily x 2 wks then 2-3x/wk, Disp: 60 g, Rfl: 6    clotrimazole-betamethasone (LOTRISONE) 1-0.05 % cream,  1 betzy, Topical BID, 15 gm, Refill(s) 1, in each ear canal BID X 7 days, Route to Pharmacy Electronically, [id number], [id number], 22 15:07:00 EST, Height/Length Dosing, cm, 54.9, 22 15:07:00 EST, Weight Dosing, kg, Disp: , Rfl:     conjugated estrogens (Premarin) 0.625 MG/GM vaginal cream, Use 0.5 grams intravaginally twice weekly to control symptoms., Disp: 1 each, Rfl: 11    cyclobenzaprine (FLEXERIL) 5 MG tablet, 1 tablet., Disp: , Rfl:     famciclovir (FAMVIR) 500 MG tablet, Take 1 tablet by mouth Every 12 (Twelve) Hours., Disp: , Rfl:     fluconazole (DIFLUCAN) 150 MG tablet, Take 1 pill today and 1 when finishing  antibiotics, Disp: 2 tablet, Rfl: 0    fluocinolone acetonide (DERMOTIC) 0.01 % oil otic oil, DermOtic Oil 0.01 % ear drops  INSTILL 5 DROPS INTO AFFECTED EAR(S) BY OTIC ROUTE 2 TIMES PER DAY x 10 DAYS (Patient not taking: Reported on 9/10/2024), Disp: , Rfl:     gabapentin (NEURONTIN) 300 MG capsule, Take 2 capsules by mouth 3 (Three) Times a Day., Disp: , Rfl:     mirtazapine (REMERON) 15 MG tablet, 1 tablet. Pt noted she was taking half dosage until 8/9 and pt took full dosage., Disp: , Rfl:     Multiple Vitamins-Minerals (MULTIVITAMIN ADULT PO), Take  by mouth., Disp: , Rfl:     nitrofurantoin, macrocrystal-monohydrate, (MACROBID) 100 MG capsule, Take 1 as needed after intercourse to prevent UTI, Disp: 30 capsule, Rfl: 11    triamcinolone (KENALOG) 0.1 % cream, Apply  topically to the appropriate area as directed 2 (Two) Times a Day., Disp: , Rfl:     Allergies   Allergen Reactions    Cefdinir Swelling    Latex Unknown - Low Severity    Tape Rash    Wound Dressing Adhesive Other (See Comments)     Objective   There were no vitals taken for this visit.     There is no height or weight on file to calculate BMI.     Physical Exam  Neurological:      Mental Status: She is alert.   Psychiatric:         Attention and Perception: Attention normal.         Mood and Affect: Mood normal.          Labs  Lab Results   Component Value Date    COLORU Yellow 05/20/2025    CLARITYU Cloudy (A) 05/20/2025    SPECGRAV 1.015 05/20/2025    PHUR 6.0 05/20/2025    LEUKOCYTESUR Large (3+) (A) 05/20/2025    NITRITE Positive (A) 05/20/2025    PROTEINPOCUA Trace (A) 05/20/2025    GLUCOSEUR Negative 05/20/2025    KETONESU Negative 05/20/2025    UROBILINOGEN 0.2 E.U./dL 05/20/2025    BILIRUBINUR Negative 05/20/2025    RBCUR Small (A) 05/20/2025      Lab Results   Component Value Date    RBCUA 0-3 03/01/2014    BACTERIA TRACE 03/01/2014      Urine Culture          12/3/2024    00:00   Urine Culture   Urine Culture Final report      No  "results found for: \"WBC\", \"HGB\", \"HCT\", \"MCV\", \"PLT\"  Lab Results   Component Value Date    GLUCOSE 75 07/26/2022    CALCIUM 10.2 07/26/2022     07/26/2022    K 4.8 07/26/2022    CO2 25.1 07/26/2022     07/26/2022    BUN 16 07/26/2022    BUN 15 03/21/2019    CREATININE 0.59 07/26/2022    CREATININE 0.50 (L) 03/21/2019    EGFR 106.6 07/26/2022    BCR 27.1 (H) 07/26/2022     No results found for: \"HGBA1C\"  No results found for: \"URICACIDSTN\", \"NLAW8AXRNOR\", \"KESY8GGBVV\", \"LABMAGN\"  Lab Results   Component Value Date    PTH 25 03/15/2018     Lab Results   Component Value Date    LABPH 7.0 03/01/2014       Lab Results   Component Value Date    ATOPOBIUMV Low - 0 09/08/2023    BVAB2 Low - 0 09/08/2023    MEGASPHAER Low - 0 09/08/2023    CALBICANSN Negative 09/08/2023    CGLABRATAN Negative 09/08/2023    TRICHVAG Negative 09/08/2023    CHLAMNAA Negative 09/08/2023    NGONORRHON Negative 09/08/2023       No results found for: \"FERRITIN\", \"FSH\", \"SEXMONB\", \"TSH\", \"FREET4\", \"T3FREE\", \"TPOABRFLX\", \"GNYPLKXY59\", \"XABI46NF\", \"CORTISOL\", \"TLESTROGENS\", \"TESTOSTEROTT\", \"TESTFRE\", \"LIPIDEXCLUSI\"      Radiographic Studies  No Images in the past 120 days found..    I have reviewed the above labs and imaging.     Assessment / Plan      Diagnoses and all orders for this visit:    1. Acute UTI (urinary tract infection) (Primary)  -     Urine Culture - Urine, Urine, Clean Catch  -     POC Urinalysis Dipstick, Automated  -     ciprofloxacin (Cipro) 250 MG tablet; Take 1 tablet by mouth 2 (Two) Times a Day for 7 days.  Dispense: 14 tablet; Refill: 0         Assessment & Plan  1. UTI  - Prescribed Cipro 250 mg BID for 7 days  - Discontinue Macrobid during Cipro treatment  - Send urine culture   - Resume Macrobid for suppression post-Cipro  - Communicate urine culture results via Epic and adjust treatment plan as needed       Return if symptoms worsen or fail to improve.    Carol Mckeon, MSN, APRN, FNP-C  [facility] " [place]

## 2025-05-20 NOTE — TELEPHONE ENCOUNTER
Hub staff attempted to follow warm transfer process and was unsuccessful     Caller: Sosa De Luna    Relationship to patient: Self    Best call back number: There are no phone numbers on file.     Patient is needing: PT IS RETURNING CALL TO [place]. PLEASE CALL PT TO ADVISE AND SCHEDULE VIDEO VISIT. THANK YOU.

## 2025-05-23 LAB
BACTERIA UR CULT: ABNORMAL
BACTERIA UR CULT: ABNORMAL
OTHER ANTIBIOTIC SUSC ISLT: ABNORMAL

## 2025-06-30 ENCOUNTER — TELEPHONE (OUTPATIENT)
Dept: CARDIOLOGY | Facility: CLINIC | Age: 60
End: 2025-06-30
Payer: MEDICARE

## 2025-06-30 NOTE — TELEPHONE ENCOUNTER
"Attempted  reach pt to R/S due to provider being on call LMOM  Relay     \"Ok to schedule pt with NP or 's next available if they wish to wait.\"                 "